# Patient Record
Sex: FEMALE | Race: WHITE | Employment: FULL TIME | URBAN - METROPOLITAN AREA
[De-identification: names, ages, dates, MRNs, and addresses within clinical notes are randomized per-mention and may not be internally consistent; named-entity substitution may affect disease eponyms.]

---

## 2017-04-05 ENCOUNTER — OFFICE VISIT (OUTPATIENT)
Dept: FAMILY MEDICINE | Facility: CLINIC | Age: 37
End: 2017-04-05
Payer: COMMERCIAL

## 2017-04-05 VITALS
HEIGHT: 61 IN | HEART RATE: 88 BPM | SYSTOLIC BLOOD PRESSURE: 120 MMHG | BODY MASS INDEX: 34.74 KG/M2 | WEIGHT: 184 LBS | TEMPERATURE: 98.4 F | DIASTOLIC BLOOD PRESSURE: 84 MMHG

## 2017-04-05 DIAGNOSIS — R07.89 NON-CARDIAC CHEST PAIN: ICD-10-CM

## 2017-04-05 DIAGNOSIS — K21.9 GASTROESOPHAGEAL REFLUX DISEASE WITHOUT ESOPHAGITIS: Primary | ICD-10-CM

## 2017-04-05 PROCEDURE — 99214 OFFICE O/P EST MOD 30 MIN: CPT | Performed by: FAMILY MEDICINE

## 2017-04-05 RX ORDER — LEVOTHYROXINE SODIUM 100 UG/1
1 TABLET ORAL DAILY
COMMUNITY
Start: 2015-03-06

## 2017-04-05 NOTE — NURSING NOTE
"Chief Complaint   Patient presents with     Chest Pain       Initial /84  Pulse 88  Temp 98.4  F (36.9  C) (Tympanic)  Ht 5' 1\" (1.549 m)  Wt 184 lb (83.5 kg)  LMP 01/04/2017  BMI 34.77 kg/m2 Estimated body mass index is 34.77 kg/(m^2) as calculated from the following:    Height as of this encounter: 5' 1\" (1.549 m).    Weight as of this encounter: 184 lb (83.5 kg).  Medication Reconciliation: complete    Current Outpatient Prescriptions   Medication Sig Dispense Refill     metFORMIN (GLUCOPHAGE) 500 MG tablet 1 tablet 2 times daily (with meals)       levothyroxine (SYNTHROID/LEVOTHROID) 100 MCG tablet 1 tablet daily       Prenatal Vit-Fe Fumarate-FA (PRENATAL ONE DAILY PO) Take 1 tablet by mouth daily       Cholecalciferol (VITAMIN D3 PO) Take by mouth daily       Omega-3 Fatty Acids (OMEGA-3 FISH OIL PO) Take 2 g by mouth 2 times daily (with meals)         Thompson COOK CMA  "

## 2017-04-05 NOTE — PROGRESS NOTES
SUBJECTIVE:                                                    Tahmina Haji is a 36 year old female who presents to clinic today for the following health issues:    CHEST PAIN     Onset: 1 week    Feels burning, no chest pain, shortness of breath. No exertional symptoms, has use ibuprofen for  1 week for hip pain.    Description:   Location:  entire chest  Character: achey and burning  Radiation: epigastrium  Duration: waxing and waning     Intensity: moderate    Progression of Symptoms:  waxing and waning    Accompanying Signs & Symptoms:  Shortness of breath: no  Sweating: no  Nausea/vomiting: no  Lightheadedness: no  Palpitations: no  Fever/Chills: no   Cough: no   Heartburn: YES    History:   Family history of heart disease YES  Tobacco use: no    Precipitating factors:   Worse with exertion: no   Worse with deep breaths :  no   Related to food: Possible - grapefruit made it worse                   Problem list and histories reviewed & adjusted, as indicated.  Additional history: as documented    Patient Active Problem List   Diagnosis     CARDIOVASCULAR SCREENING; LDL GOAL LESS THAN 160     Hypothyroidism     Past Surgical History:   Procedure Laterality Date     NO HISTORY OF SURGERY         Social History   Substance Use Topics     Smoking status: Never Smoker     Smokeless tobacco: Never Used     Alcohol use Yes     Family History   Problem Relation Age of Onset     CEREBROVASCULAR DISEASE Maternal Grandmother      HEART DISEASE Paternal Grandfather      CEREBROVASCULAR DISEASE Paternal Grandfather      Cardiovascular Paternal Grandfather          Current Outpatient Prescriptions   Medication Sig Dispense Refill     metFORMIN (GLUCOPHAGE) 500 MG tablet 1 tablet 2 times daily (with meals)       levothyroxine (SYNTHROID/LEVOTHROID) 100 MCG tablet 1 tablet daily       Prenatal Vit-Fe Fumarate-FA (PRENATAL ONE DAILY PO) Take 1 tablet by mouth daily       omeprazole (PRILOSEC) 20 MG CR capsule Take 1  "capsule (20 mg) by mouth daily 30 capsule 1     Cholecalciferol (VITAMIN D3 PO) Take by mouth daily       Omega-3 Fatty Acids (OMEGA-3 FISH OIL PO) Take 2 g by mouth 2 times daily (with meals)         Reviewed and updated as needed this visit by clinical staff  Tobacco  Allergies  Meds  Soc Hx      Reviewed and updated as needed this visit by Provider         ROS:  C: NEGATIVE for fever, chills, change in weight  E/M: NEGATIVE for ear, mouth and throat problems  R: NEGATIVE for significant cough or SOB  CV: burning sensation in the chest.    OBJECTIVE:                                                    /84  Pulse 88  Temp 98.4  F (36.9  C) (Tympanic)  Ht 5' 1\" (1.549 m)  Wt 184 lb (83.5 kg)  LMP 01/04/2017  BMI 34.77 kg/m2  Body mass index is 34.77 kg/(m^2).   GENERAL: healthy, alert, well nourished, well hydrated, no distress  NECK: no tenderness, no adenopathy, no asymmetry, no masses, no stiffness; thyroid- normal to palpation  RESP: lungs clear to auscultation - no rales, no rhonchi, no wheezes  CV: regular rates and rhythm, normal S1 S2, no S3 or S4 and no murmur, no click or rub -  ABDOMEN: soft, no tenderness, no  hepatosplenomegaly, no masses, normal bowel sounds         ASSESSMENT/PLAN:                                                        ICD-10-CM    1. Gastroesophageal reflux disease without esophagitis K21.9 omeprazole (PRILOSEC) 20 MG CR capsule   2. Non-cardiac chest pain R07.89        Patient does not have have exertional symptoms., no chest pain, radiating, most of her discomfort in epigastric area.  Symptoms more consistent with GERD or stomach issues. Advice to avoid ibuprofen or similar medication.  Use Prilosec 20 mg for 2 weeks and if needed can continue for next 1 month.   Warning sings were discussed with the patient.    Gregg Greenberg MD  [facility] [place]    "

## 2017-04-05 NOTE — MR AVS SNAPSHOT
"              After Visit Summary   4/5/2017    Tahmina Haji    MRN: [id number]           Patient Information     Date Of Birth          1980        Visit Information        Provider Department      4/5/2017 3:20 PM Gregg Greenberg MD [facility] [place]        Today's Diagnoses     Gastroesophageal reflux disease without esophagitis    -  1    Non-cardiac chest pain           Follow-ups after your visit        Who to contact     If you have questions or need follow up information about today's clinic visit or your schedule please contact [facility] [place] directly at [phone].  Normal or non-critical lab and imaging results will be communicated to you by [employer], letter or phone within 4 business days after the clinic has received the results. If you do not hear from us within 7 days, please contact the clinic through [employer] or phone. If you have a critical or abnormal lab result, we will notify you by phone as soon as possible.  Submit refill requests through [facility] or call your pharmacy and they will forward the refill request to us. Please allow 3 business days for your refill to be completed.          Additional Information About Your Visit        MyChart Information     [facility] gives you secure access to your electronic health record. If you see a primary care provider, you can also send messages to your care team and make appointments. If you have questions, please call your primary care clinic.  If you do not have a primary care provider, please call [phone] and they will assist you.        Care EveryWhere ID     This is your Care EveryWhere ID. This could be used by other organizations to access your [facility] medical records  [id number]        Your Vitals Were     Pulse Temperature Height Last Period BMI (Body Mass Index)       88 98.4  F (36.9  C) (Tympanic) 5' 1\" (1.549 m) 01/04/2017 34.77 kg/m2        Blood Pressure from Last 3 Encounters:   04/05/17 120/84 "   08/26/14 118/82    Weight from Last 3 Encounters:   04/05/17 184 lb (83.5 kg)   08/26/14 182 lb (82.6 kg)              Today, you had the following     No orders found for display         Today's Medication Changes          These changes are accurate as of: 4/5/17  3:50 PM.  If you have any questions, ask your nurse or doctor.               Start taking these medicines.        Dose/Directions    omeprazole 20 MG CR capsule   Commonly known as:  priLOSEC   Used for:  Gastroesophageal reflux disease without esophagitis   Started by:  Gregg Greenberg MD        Dose:  20 mg   Take 1 capsule (20 mg) by mouth daily   Quantity:  30 capsule   Refills:  1         These medicines have changed or have updated prescriptions.        Dose/Directions    levothyroxine 100 MCG tablet   Commonly known as:  SYNTHROID/LEVOTHROID   This may have changed:  Another medication with the same name was removed. Continue taking this medication, and follow the directions you see here.   Changed by:  Gregg Greenberg MD        Dose:  1 tablet   1 tablet daily   Refills:  0         Stop taking these medicines if you haven't already. Please contact your care team if you have questions.     Multi-vitamin Tabs tablet   Stopped by:  Gregg Greenberg MD                Where to get your medicines      These medications were sent to [id number] - [place], [place] - [address]  [address] [facility] [place] [address]     Phone:  [phone]     omeprazole 20 MG CR capsule                Primary Care Provider Office Phone # Fax #    Julieth Gutierres PA-C [phone]       [facility] [address] [place] [address] [address]  [place] [address]        Thank you!     [facility] [place]  [facility]. Our goal is always to provide you with excellent care. Hearing back from our patients is one way we can continue to improve our services. Please take a few minutes to complete the  written survey that you may receive in the mail after your visit with us. Thank you!             Your Updated Medication List - Protect others around you: Learn how to safely use, store and throw away your medicines at www.disposemymeds.org.          This list is accurate as of: 4/5/17  3:50 PM.  Always use your most recent med list.                   Brand Name Dispense Instructions for use    levothyroxine 100 MCG tablet    SYNTHROID/LEVOTHROID     1 tablet daily       metFORMIN 500 MG tablet    GLUCOPHAGE     1 tablet 2 times daily (with meals)       OMEGA-3 FISH OIL PO      Take 2 g by mouth 2 times daily (with meals)       omeprazole 20 MG CR capsule    priLOSEC    30 capsule    Take 1 capsule (20 mg) by mouth daily       PRENATAL ONE DAILY PO      Take 1 tablet by mouth daily       VITAMIN D3 PO      Take by mouth daily

## 2017-11-15 LAB
ABO + RH BLD: NORMAL
ABO + RH BLD: NORMAL
BLD GP AB SCN SERPL QL: NORMAL
HBV SURFACE AG SERPL QL IA: NORMAL
HIV 1+2 AB+HIV1 P24 AG SERPL QL IA: NORMAL
RUBELLA ANTIBODY IGG QUANTITATIVE: NORMAL IU/ML
T PALLIDUM IGG SER QL: NON REACTIVE

## 2017-12-17 ENCOUNTER — HEALTH MAINTENANCE LETTER (OUTPATIENT)
Age: 37
End: 2017-12-17

## 2017-12-19 ENCOUNTER — TELEPHONE (OUTPATIENT)
Dept: FAMILY MEDICINE | Facility: CLINIC | Age: 37
End: 2017-12-19

## 2017-12-19 NOTE — TELEPHONE ENCOUNTER
"Patient Communication Preferences indicate  Do not contact  and/or communication by \"Phone\" is not preferred. Call not required per Outreach team.      Outreach ,  Leann Pardo                      "

## 2018-06-01 LAB — GROUP B STREP PCR: NORMAL

## 2018-06-16 ENCOUNTER — HOSPITAL ENCOUNTER (OUTPATIENT)
Facility: CLINIC | Age: 38
Discharge: HOME | End: 2018-06-17
Attending: OBSTETRICS & GYNECOLOGY | Admitting: OBSTETRICS & GYNECOLOGY
Payer: COMMERCIAL

## 2018-06-16 LAB
ALT SERPL W P-5'-P-CCNC: 18 U/L (ref 0–50)
ANION GAP SERPL CALCULATED.3IONS-SCNC: 8 MMOL/L (ref 3–14)
AST SERPL W P-5'-P-CCNC: 17 U/L (ref 0–45)
BUN SERPL-MCNC: 6 MG/DL (ref 7–30)
CALCIUM SERPL-MCNC: 8.6 MG/DL (ref 8.5–10.1)
CHLORIDE SERPL-SCNC: 109 MMOL/L (ref 94–109)
CO2 SERPL-SCNC: 23 MMOL/L (ref 20–32)
CREAT SERPL-MCNC: 0.53 MG/DL (ref 0.52–1.04)
ERYTHROCYTE [DISTWIDTH] IN BLOOD BY AUTOMATED COUNT: 13.2 % (ref 10–15)
GFR SERPL CREATININE-BSD FRML MDRD: >90 ML/MIN/1.7M2
GLUCOSE SERPL-MCNC: 102 MG/DL (ref 70–99)
HCT VFR BLD AUTO: 34.6 % (ref 35–47)
HGB BLD-MCNC: 12 G/DL (ref 11.7–15.7)
MCH RBC QN AUTO: 29.1 PG (ref 26.5–33)
MCHC RBC AUTO-ENTMCNC: 34.7 G/DL (ref 31.5–36.5)
MCV RBC AUTO: 84 FL (ref 78–100)
PLATELET # BLD AUTO: 174 10E9/L (ref 150–450)
POTASSIUM SERPL-SCNC: 3.3 MMOL/L (ref 3.4–5.3)
PROT UR-MCNC: 0.16 G/L
PROT/CREAT 24H UR: 0.27 G/G CR (ref 0–0.2)
RBC # BLD AUTO: 4.12 10E12/L (ref 3.8–5.2)
RUPTURE OF FETAL MEMBRANES BY ROM PLUS: NEGATIVE
SODIUM SERPL-SCNC: 140 MMOL/L (ref 133–144)
URATE SERPL-MCNC: 3.8 MG/DL (ref 2.6–6)
WBC # BLD AUTO: 9.3 10E9/L (ref 4–11)

## 2018-06-16 PROCEDURE — 84550 ASSAY OF BLOOD/URIC ACID: CPT | Performed by: OBSTETRICS & GYNECOLOGY

## 2018-06-16 PROCEDURE — 84156 ASSAY OF PROTEIN URINE: CPT | Performed by: OBSTETRICS & GYNECOLOGY

## 2018-06-16 PROCEDURE — 84450 TRANSFERASE (AST) (SGOT): CPT | Performed by: OBSTETRICS & GYNECOLOGY

## 2018-06-16 PROCEDURE — 80048 BASIC METABOLIC PNL TOTAL CA: CPT | Performed by: OBSTETRICS & GYNECOLOGY

## 2018-06-16 PROCEDURE — 84460 ALANINE AMINO (ALT) (SGPT): CPT | Performed by: OBSTETRICS & GYNECOLOGY

## 2018-06-16 PROCEDURE — G0463 HOSPITAL OUTPT CLINIC VISIT: HCPCS | Mod: 25

## 2018-06-16 PROCEDURE — 85027 COMPLETE CBC AUTOMATED: CPT | Performed by: OBSTETRICS & GYNECOLOGY

## 2018-06-16 PROCEDURE — 84112 EVAL AMNIOTIC FLUID PROTEIN: CPT | Performed by: OBSTETRICS & GYNECOLOGY

## 2018-06-16 PROCEDURE — 36415 COLL VENOUS BLD VENIPUNCTURE: CPT | Performed by: OBSTETRICS & GYNECOLOGY

## 2018-06-16 PROCEDURE — 59025 FETAL NON-STRESS TEST: CPT

## 2018-06-16 RX ORDER — ONDANSETRON 2 MG/ML
4 INJECTION INTRAMUSCULAR; INTRAVENOUS EVERY 6 HOURS PRN
Status: DISCONTINUED | OUTPATIENT
Start: 2018-06-16 | End: 2018-06-17 | Stop reason: HOSPADM

## 2018-06-16 NOTE — IP AVS SNAPSHOT
MRN:[id number]                      After Visit Summary   6/16/2018    Tahmina Haji    MRN: [id number]           Thank you!     Thank you for choosing [facility] for your care. Our goal is always to provide you with excellent care. Hearing back from our patients is one way we can continue to improve our services. Please take a few minutes to complete the written survey that you may receive in the mail after you visit with us. Thank you!        Patient Information     Date Of Birth          1980        About your hospital stay     You were admitted on:  June 16, 2018 You last received care in the:  [facility]    You were discharged on:  June 17, 2018       Who to Call     For medical emergencies, please call 911.  For non-urgent questions about your medical care, please call your primary care provider or clinic, None          Attending Provider     Provider Specialty    Komal Guido MD OB/Gyn       Primary Care Provider    None Specified      Further instructions from your care team       Discharge Instruction for Undelivered Patients      You were seen for: Membrane Assessment  We Consulted: Dr. Guido  You had (Test or Medicine):Fetal Monitoring, ROM+, Preeclamptic Labs, and Blood Pressure monitoring     Diet:   Drink 8 to 12 glasses of liquids (milk, juice, water) every day.  You may eat meals and snacks.     Activity:  Count fetal kicks everyday (see handout)  Call your doctor or nurse midwife if your baby is moving less than usual.     Call your provider if you notice:  Swelling in your face or increased swelling in your hands or legs.  Headaches that are not relieved by Tylenol (acetaminophen).  Changes in your vision (blurring: seeing spots or stars.)  Nausea (sick to your stomach) and vomiting (throwing up).   Weight gain of 5 pounds or more per week.  Heartburn that doesn't go away.  Signs of bladder infection: pain when you urinate (use the toilet), need to go  "more often and more urgently.  The bag of lyon (rupture of membranes) breaks, or you notice leaking in your underwear.  Bright red blood in your underwear.  Abdominal (lower belly) or stomach pain.  For first baby: Contractions (tightening) less than 5 minutes apart for one hour or more.  Second (plus) baby: Contractions (tightening) less than 10 minutes apart and getting stronger.  *If less than 34 weeks: Contractions (tightenings) more than 6 times in one hour.  Increase or change in vaginal discharge (note the color and amount)      Follow-up:  As scheduled in the clinic          Pending Results     No orders found for last 3 day(s).            Admission Information     Date & Time Provider Department Dept. Phone    6/16/2018 Komal Guido MD [facility] [phone]      Your Vitals Were     Blood Pressure Pulse Temperature Respirations Height Weight    128/73 105 98.3  F (36.8  C) (Temporal) 18 1.575 m (5' 2\") 86.2 kg (190 lb)    BMI (Body Mass Index)                   34.75 kg/m2           Observe [employer] Information     [facility] gives you secure access to your electronic health record. If you see a primary care provider, you can also send messages to your care team and make appointments. If you have questions, please call your primary care clinic.  If you do not have a primary care provider, please call [phone] and they will assist you.        Care EveryWhere ID     This is your Care EveryWhere ID. This could be used by other organizations to access your [facility] medical records  [id number]        Equal Access to Services     LEROY BENSON : Hadii salvador betheao Sojaelali, waaxda luqadaha, qaybta kaalmada juan, julieta watts. So [facility] [phone].    ATENCIÓN: Si habla español, tiene a salvador disposición servicios gratuitos de asistencia lingüística. Llame al [phone].    We comply with applicable federal civil rights laws and [place] laws. We do not " discriminate on the basis of race, color, national origin, age, disability, sex, sexual orientation, or gender identity.               Review of your medicines      UNREVIEWED medicines. Ask your doctor about these medicines        Dose / Directions    levothyroxine 100 MCG tablet   Commonly known as:  SYNTHROID/LEVOTHROID        Dose:  1 tablet   1 tablet daily   Refills:  0       metFORMIN 500 MG tablet   Commonly known as:  GLUCOPHAGE        Dose:  1 tablet   1 tablet 2 times daily (with meals)   Refills:  0       OMEGA-3 FISH OIL PO        Dose:  2 g   Take 2 g by mouth 2 times daily (with meals)   Refills:  0       PRENATAL ONE DAILY PO        Dose:  1 tablet   Take 1 tablet by mouth daily   Refills:  0       VITAMIN D3 PO        Take by mouth daily   Refills:  0                Protect others around you: Learn how to safely use, store and throw away your medicines at www.disposemymeds.org.             Medication List: This is a list of all your medications and when to take them. Check marks below indicate your daily home schedule. Keep this list as a reference.      Medications           Morning Afternoon Evening Bedtime As Needed    levothyroxine 100 MCG tablet   Commonly known as:  SYNTHROID/LEVOTHROID   1 tablet daily                                metFORMIN 500 MG tablet   Commonly known as:  GLUCOPHAGE   1 tablet 2 times daily (with meals)                                OMEGA-3 FISH OIL PO   Take 2 g by mouth 2 times daily (with meals)                                PRENATAL ONE DAILY PO   Take 1 tablet by mouth daily                                VITAMIN D3 PO   Take by mouth daily

## 2018-06-16 NOTE — IP AVS SNAPSHOT
[address], [address]    [facility] [address]    Phone:  [phone]                                       After Visit Summary   6/16/2018    Tahmina Haji    MRN: [id number]           After Visit Summary Signature Page     I have received my discharge instructions, and my questions have been answered. I have discussed any challenges I see with this plan with the nurse or doctor.    ..........................................................................................................................................  Patient/Patient Representative Signature      ..........................................................................................................................................  Patient Representative Print Name and Relationship to Patient    ..................................................               ................................................  Date                                            Time    ..........................................................................................................................................  Reviewed by Signature/Title    ...................................................              ..............................................  Date                                                            Time

## 2018-06-17 VITALS
DIASTOLIC BLOOD PRESSURE: 78 MMHG | RESPIRATION RATE: 18 BRPM | HEIGHT: 62 IN | WEIGHT: 190 LBS | BODY MASS INDEX: 34.96 KG/M2 | SYSTOLIC BLOOD PRESSURE: 121 MMHG | TEMPERATURE: 98.3 F | HEART RATE: 105 BPM

## 2018-06-17 PROCEDURE — 59025 FETAL NON-STRESS TEST: CPT

## 2018-06-17 PROCEDURE — G0463 HOSPITAL OUTPT CLINIC VISIT: HCPCS | Mod: 25

## 2018-06-17 NOTE — DISCHARGE INSTRUCTIONS
Discharge Instruction for Undelivered Patients      You were seen for: Membrane Assessment  We Consulted: Dr. Guido  You had (Test or Medicine):Fetal Monitoring, ROM+, Preeclamptic Labs, and Blood Pressure monitoring     Diet:   Drink 8 to 12 glasses of liquids (milk, juice, water) every day.  You may eat meals and snacks.     Activity:  Count fetal kicks everyday (see handout)  Call your doctor or nurse midwife if your baby is moving less than usual.     Call your provider if you notice:  Swelling in your face or increased swelling in your hands or legs.  Headaches that are not relieved by Tylenol (acetaminophen).  Changes in your vision (blurring: seeing spots or stars.)  Nausea (sick to your stomach) and vomiting (throwing up).   Weight gain of 5 pounds or more per week.  Heartburn that doesn't go away.  Signs of bladder infection: pain when you urinate (use the toilet), need to go more often and more urgently.  The bag of lyon (rupture of membranes) breaks, or you notice leaking in your underwear.  Bright red blood in your underwear.  Abdominal (lower belly) or stomach pain.  For first baby: Contractions (tightening) less than 5 minutes apart for one hour or more.  Second (plus) baby: Contractions (tightening) less than 10 minutes apart and getting stronger.  *If less than 34 weeks: Contractions (tightenings) more than 6 times in one hour.  Increase or change in vaginal discharge (note the color and amount)      Follow-up:  As scheduled in the clinic

## 2018-06-17 NOTE — PROVIDER NOTIFICATION
Dr. Guido notified of patients negative ROM+, elevated blood pressures and SVE.  Plan to run preeclampsia labs.  Will continue to monitor and update.

## 2018-06-17 NOTE — PROVIDER NOTIFICATION
Dr. Guido updated with patients laboratory results and blood pressures.  Pressures trending down.  Plan to keep patient for one hour and repeat blood pressures every 15 minutes.  Will continue to monitor and update.

## 2018-06-17 NOTE — PROGRESS NOTES
Data: Patient presented to the [facility] at 2133 18.   Reason for maternal/fetal assessment per patient is rule out rupture of membranes. Patient is a . Prenatal record reviewed. Gestational Age 39w0d. VSS.  Blood Pressure elevated. Cervix: posterior, closed and effaced 30-50%.  Fetal movement present. Patient denies cramping, backache, vaginal discharge, pelvic pressure, UTI symptoms, GI problems, bloody show, vaginal bleeding, edema, headache, visual disturbances, epigastric or URQ pain, abdominal pain, rupture of membranes. Support persons Emir, , present.  Action: Verbal consent for EFM. Triage assessment completed. EFM applied for 3 hours. Uterine assessment: Occasional contractions with irritability. Fetal assessment: Presumed adequate fetal oxygenation documented (see flow record).  Patient instructed to report change in fetal movement, vaginal leaking of fluid or bleeding, abdominal pain, or any concerns related to the pregnancy to her nurse/physician.   Response: Dr. Guido informed of patient status. Plan per provider is to discharge home. Patient verbalized understanding of education and verbalized agreement with plan. Discharged ambulatory at 0050.

## 2018-06-22 ENCOUNTER — HOSPITAL ENCOUNTER (INPATIENT)
Facility: CLINIC | Age: 38
LOS: 3 days | Discharge: HOME HEALTH | End: 2018-06-25
Attending: OBSTETRICS & GYNECOLOGY | Admitting: OBSTETRICS & GYNECOLOGY
Payer: COMMERCIAL

## 2018-06-22 PROBLEM — O13.9 GESTATIONAL HTN: Status: ACTIVE | Noted: 2018-06-22

## 2018-06-22 PROBLEM — O13.9 GESTATIONAL HYPERTENSION: Status: ACTIVE | Noted: 2018-06-22

## 2018-06-22 LAB
ABO + RH BLD: NORMAL
ABO + RH BLD: NORMAL
ALBUMIN SERPL-MCNC: 2.6 G/DL (ref 3.4–5)
ALP SERPL-CCNC: 96 U/L (ref 40–150)
ALT SERPL W P-5'-P-CCNC: 16 U/L (ref 0–50)
ANION GAP SERPL CALCULATED.3IONS-SCNC: 8 MMOL/L (ref 3–14)
AST SERPL W P-5'-P-CCNC: 16 U/L (ref 0–45)
BILIRUB SERPL-MCNC: 0.3 MG/DL (ref 0.2–1.3)
BLD GP AB SCN SERPL QL: NORMAL
BLOOD BANK CMNT PATIENT-IMP: NORMAL
BUN SERPL-MCNC: 8 MG/DL (ref 7–30)
CALCIUM SERPL-MCNC: 9 MG/DL (ref 8.5–10.1)
CHLORIDE SERPL-SCNC: 109 MMOL/L (ref 94–109)
CO2 SERPL-SCNC: 23 MMOL/L (ref 20–32)
CREAT SERPL-MCNC: 0.67 MG/DL (ref 0.52–1.04)
CREAT UR-MCNC: 24 MG/DL
ERYTHROCYTE [DISTWIDTH] IN BLOOD BY AUTOMATED COUNT: 13.3 % (ref 10–15)
GFR SERPL CREATININE-BSD FRML MDRD: >90 ML/MIN/1.7M2
GLUCOSE SERPL-MCNC: 78 MG/DL (ref 70–99)
HCT VFR BLD AUTO: 35.7 % (ref 35–47)
HGB BLD-MCNC: 12.3 G/DL (ref 11.7–15.7)
MCH RBC QN AUTO: 29.1 PG (ref 26.5–33)
MCHC RBC AUTO-ENTMCNC: 34.5 G/DL (ref 31.5–36.5)
MCV RBC AUTO: 84 FL (ref 78–100)
PLATELET # BLD AUTO: 186 10E9/L (ref 150–450)
POTASSIUM SERPL-SCNC: 3.7 MMOL/L (ref 3.4–5.3)
PROT SERPL-MCNC: 6.2 G/DL (ref 6.8–8.8)
PROT UR-MCNC: <0.05 G/L
PROT/CREAT 24H UR: NORMAL G/G CR (ref 0–0.2)
RBC # BLD AUTO: 4.23 10E12/L (ref 3.8–5.2)
SODIUM SERPL-SCNC: 140 MMOL/L (ref 133–144)
SPECIMEN EXP DATE BLD: NORMAL
WBC # BLD AUTO: 8.2 10E9/L (ref 4–11)

## 2018-06-22 PROCEDURE — 25000128 H RX IP 250 OP 636: Performed by: OBSTETRICS & GYNECOLOGY

## 2018-06-22 PROCEDURE — 84460 ALANINE AMINO (ALT) (SGPT): CPT | Performed by: OBSTETRICS & GYNECOLOGY

## 2018-06-22 PROCEDURE — 86901 BLOOD TYPING SEROLOGIC RH(D): CPT | Performed by: OBSTETRICS & GYNECOLOGY

## 2018-06-22 PROCEDURE — 3E0P7VZ INTRODUCTION OF HORMONE INTO FEMALE REPRODUCTIVE, VIA NATURAL OR ARTIFICIAL OPENING: ICD-10-PCS | Performed by: OBSTETRICS & GYNECOLOGY

## 2018-06-22 PROCEDURE — 36415 COLL VENOUS BLD VENIPUNCTURE: CPT | Performed by: OBSTETRICS & GYNECOLOGY

## 2018-06-22 PROCEDURE — 86900 BLOOD TYPING SEROLOGIC ABO: CPT | Performed by: OBSTETRICS & GYNECOLOGY

## 2018-06-22 PROCEDURE — G0463 HOSPITAL OUTPT CLINIC VISIT: HCPCS | Mod: 25

## 2018-06-22 PROCEDURE — 12000029 ZZH R&B OB INTERMEDIATE

## 2018-06-22 PROCEDURE — 25000132 ZZH RX MED GY IP 250 OP 250 PS 637: Performed by: OBSTETRICS & GYNECOLOGY

## 2018-06-22 PROCEDURE — 86850 RBC ANTIBODY SCREEN: CPT | Performed by: OBSTETRICS & GYNECOLOGY

## 2018-06-22 PROCEDURE — 10907ZC DRAINAGE OF AMNIOTIC FLUID, THERAPEUTIC FROM PRODUCTS OF CONCEPTION, VIA NATURAL OR ARTIFICIAL OPENING: ICD-10-PCS | Performed by: OBSTETRICS & GYNECOLOGY

## 2018-06-22 PROCEDURE — 84156 ASSAY OF PROTEIN URINE: CPT | Performed by: OBSTETRICS & GYNECOLOGY

## 2018-06-22 PROCEDURE — 80053 COMPREHEN METABOLIC PANEL: CPT | Performed by: OBSTETRICS & GYNECOLOGY

## 2018-06-22 PROCEDURE — 59025 FETAL NON-STRESS TEST: CPT

## 2018-06-22 PROCEDURE — 85027 COMPLETE CBC AUTOMATED: CPT | Performed by: OBSTETRICS & GYNECOLOGY

## 2018-06-22 RX ORDER — TERBUTALINE SULFATE 1 MG/ML
0.25 INJECTION, SOLUTION SUBCUTANEOUS
Status: DISCONTINUED | OUTPATIENT
Start: 2018-06-22 | End: 2018-06-23

## 2018-06-22 RX ORDER — LABETALOL HYDROCHLORIDE 5 MG/ML
40 INJECTION, SOLUTION INTRAVENOUS EVERY 10 MIN PRN
Status: DISCONTINUED | OUTPATIENT
Start: 2018-06-22 | End: 2018-06-22 | Stop reason: CLARIF

## 2018-06-22 RX ORDER — LABETALOL HYDROCHLORIDE 5 MG/ML
20 INJECTION, SOLUTION INTRAVENOUS EVERY 10 MIN PRN
Status: DISCONTINUED | OUTPATIENT
Start: 2018-06-22 | End: 2018-06-22 | Stop reason: CLARIF

## 2018-06-22 RX ORDER — MAGNESIUM SULFATE HEPTAHYDRATE 40 MG/ML
4 INJECTION, SOLUTION INTRAVENOUS
Status: DISCONTINUED | OUTPATIENT
Start: 2018-06-22 | End: 2018-06-22 | Stop reason: CLARIF

## 2018-06-22 RX ORDER — LORAZEPAM 2 MG/ML
2 INJECTION INTRAMUSCULAR
Status: DISCONTINUED | OUTPATIENT
Start: 2018-06-22 | End: 2018-06-22 | Stop reason: CLARIF

## 2018-06-22 RX ORDER — LABETALOL HYDROCHLORIDE 5 MG/ML
80 INJECTION, SOLUTION INTRAVENOUS EVERY 10 MIN PRN
Status: DISCONTINUED | OUTPATIENT
Start: 2018-06-22 | End: 2018-06-22 | Stop reason: CLARIF

## 2018-06-22 RX ORDER — SODIUM CHLORIDE, SODIUM LACTATE, POTASSIUM CHLORIDE, CALCIUM CHLORIDE 600; 310; 30; 20 MG/100ML; MG/100ML; MG/100ML; MG/100ML
10-125 INJECTION, SOLUTION INTRAVENOUS CONTINUOUS
Status: DISCONTINUED | OUTPATIENT
Start: 2018-06-22 | End: 2018-06-22 | Stop reason: CLARIF

## 2018-06-22 RX ORDER — CALCIUM GLUCONATE 94 MG/ML
1 INJECTION, SOLUTION INTRAVENOUS
Status: DISCONTINUED | OUTPATIENT
Start: 2018-06-22 | End: 2018-06-25 | Stop reason: HOSPADM

## 2018-06-22 RX ORDER — PENICILLIN G POTASSIUM 5000000 [IU]/1
5 INJECTION, POWDER, FOR SOLUTION INTRAMUSCULAR; INTRAVENOUS ONCE
Status: COMPLETED | OUTPATIENT
Start: 2018-06-22 | End: 2018-06-22

## 2018-06-22 RX ORDER — MAGNESIUM SULFATE IN WATER 40 MG/ML
2 INJECTION, SOLUTION INTRAVENOUS CONTINUOUS
Status: DISCONTINUED | OUTPATIENT
Start: 2018-06-22 | End: 2018-06-22 | Stop reason: CLARIF

## 2018-06-22 RX ORDER — MAGNESIUM SULFATE HEPTAHYDRATE 40 MG/ML
2 INJECTION, SOLUTION INTRAVENOUS
Status: DISCONTINUED | OUTPATIENT
Start: 2018-06-22 | End: 2018-06-22 | Stop reason: CLARIF

## 2018-06-22 RX ORDER — ONDANSETRON 2 MG/ML
4 INJECTION INTRAMUSCULAR; INTRAVENOUS EVERY 6 HOURS PRN
Status: DISCONTINUED | OUTPATIENT
Start: 2018-06-22 | End: 2018-06-22

## 2018-06-22 RX ORDER — MAGNESIUM SULFATE HEPTAHYDRATE 40 MG/ML
4 INJECTION, SOLUTION INTRAVENOUS ONCE
Status: DISCONTINUED | OUTPATIENT
Start: 2018-06-22 | End: 2018-06-22 | Stop reason: CLARIF

## 2018-06-22 RX ORDER — LEVOTHYROXINE SODIUM 50 UG/1
100 TABLET ORAL DAILY
Status: DISCONTINUED | OUTPATIENT
Start: 2018-06-22 | End: 2018-06-25 | Stop reason: HOSPADM

## 2018-06-22 RX ORDER — NIFEDIPINE 10 MG/1
10 CAPSULE ORAL
Status: DISCONTINUED | OUTPATIENT
Start: 2018-06-22 | End: 2018-06-22 | Stop reason: CLARIF

## 2018-06-22 RX ORDER — MAGNESIUM SULFATE HEPTAHYDRATE 500 MG/ML
4 INJECTION, SOLUTION INTRAMUSCULAR; INTRAVENOUS
Status: DISCONTINUED | OUTPATIENT
Start: 2018-06-22 | End: 2018-06-22 | Stop reason: CLARIF

## 2018-06-22 RX ADMIN — PENICILLIN G POTASSIUM 5 MILLION UNITS: 5000000 POWDER, FOR SOLUTION INTRAMUSCULAR; INTRAPLEURAL; INTRATHECAL; INTRAVENOUS at 18:45

## 2018-06-22 RX ADMIN — SODIUM CHLORIDE 2.5 MILLION UNITS: 9 INJECTION, SOLUTION INTRAVENOUS at 22:52

## 2018-06-22 RX ADMIN — DINOPROSTONE 10 MG: 10 INSERT VAGINAL at 20:34

## 2018-06-22 RX ADMIN — SODIUM CHLORIDE, POTASSIUM CHLORIDE, SODIUM LACTATE AND CALCIUM CHLORIDE 125 ML/HR: 600; 310; 30; 20 INJECTION, SOLUTION INTRAVENOUS at 18:30

## 2018-06-22 NOTE — IP AVS SNAPSHOT
MRN:[id number]                      After Visit Summary   6/22/2018    Tahmina Haji    MRN: [id number]           Thank you!     Thank you for choosing [facility] for your care. Our goal is always to provide you with excellent care. Hearing back from our patients is one way we can continue to improve our services. Please take a few minutes to complete the written survey that you may receive in the mail after you visit with us. Thank you!        Patient Information     Date Of Birth          1980        Designated Caregiver       Most Recent Value    Caregiver    Name of designated caregiver mayo      About your hospital stay     You were admitted on:  June 22, 2018 You last received care in the:  [address]    You were discharged on:  June 25, 2018       Who to Call     For medical emergencies, please call 911.  For non-urgent questions about your medical care, please call your primary care provider or clinic, [phone]          Attending Provider     Provider Specialty    Srini Loyd MD OB/Gyn    , Delon Ferrell MD OB/Gyn    Michelle, Stanislav Martell MD OB/Gyn       Primary Care Provider Office Phone # Fax #    Komal Guido [phone]      Further instructions from your care team         Preeclampsia   Call your doctor right away if you have any of the following:  - Edema (swelling) in your face or hands  - Rapid weight gain-about 1 pound or more in a day  - Headache  - Abdominal pain on your right side  - Vision problems (flashes or spots)  - You have questions or concerns once you return home.  Postpartum Vaginal Delivery Instructions    Activity       Ask family and friends for help when you need it.    Do not place anything in your vagina for 6 weeks.    You are not restricted on other activities, but take it easy for a few weeks to allow your body to recover from delivery.  You are able to do any activities you  feel up to that point.    No driving until you have stopped taking your pain medications (usually two weeks after delivery).     Call your health care provider if you have any of these symptoms:       Increased pain, swelling, redness, or fluid around your stiches from an episiotomy or perineal tear.    A fever above 100.4 F (38 C) with or without chills when placing a thermometer under your tongue.    You soak a sanitary pad with blood within 1 hour, or you see blood clots larger than a golf ball.    Bleeding that lasts more than 6 weeks.    Vaginal discharge that smells bad.    Severe pain, cramping or tenderness in your lower belly area.    A need to urinate more frequently (use the toilet more often), more urgently (use the toilet very quickly), or it burns when you urinate.    Nausea and vomiting.    Redness, swelling or pain around a vein in your leg.    Problems breastfeeding or a red or painful area on your breast.    Chest pain and cough or are gasping for air.    Problems coping with sadness, anxiety, or depression.  If you have any concerns about hurting yourself or the baby, call your provider immediately.     You have questions or concerns after you return home.     Keep your hands clean:  Always wash your hands before touching your perineal area and stitches.  This helps reduce your risk of infection.  If your hands aren't dirty, you may use an alcohol hand-rub to clean your hands. Keep your nails clean and short.        Pending Results     No orders found from 6/20/2018 to 6/23/2018.            Admission Information     Date & Time Provider Department Dept. Phone    6/22/2018 Stanislav Martinez MD [address] [phone]      Your Vitals Were     Blood Pressure Pulse Temperature Respirations Pulse Oximetry       131/93 85 98.3  F (36.8  C) (Oral) 16 95%       MyChart Information     [facility] gives you secure access to your electronic health record. If you see a  primary care provider, you can also send messages to your care team and make appointments. If you have questions, please call your primary care clinic.  If you do not have a primary care provider, please call [phone] and they will assist you.        Care EveryWhere ID     This is your Care EveryWhere ID. This could be used by other organizations to access your [facility] medical records  [id number]        Equal Access to Services     MIRA BENSON : Hadii salvador garner hadzaco Sojaelali, waaxda luqadaha, qaybta kaalmada adelindayada, julieta penn haykishortyrell valletristalisa costello . So [facility] [phone].    ATENCIÓN: Si habla esplo, tiene a salvador disposición servicios gratuitos de asistencia lingüística. Janis al [phone].    We comply with applicable federal civil rights laws and [place] laws. We do not discriminate on the basis of race, color, national origin, age, disability, sex, sexual orientation, or gender identity.               Review of your medicines      UNREVIEWED medicines. Ask your doctor about these medicines        Dose / Directions    calcium-magnesium 500-250 MG Tabs per tablet   Commonly known as:  CALMAG        Dose:  1 tablet   Take 1 tablet by mouth daily   Refills:  0       levothyroxine 100 MCG tablet   Commonly known as:  SYNTHROID/LEVOTHROID        Dose:  1 tablet   1 tablet daily   Refills:  0       OMEGA-3 FISH OIL PO        Dose:  2 g   Take 2 g by mouth 2 times daily (with meals)   Refills:  0       PRENATAL ONE DAILY PO        Dose:  1 tablet   Take 1 tablet by mouth daily   Refills:  0       VITAMIN D3 PO        Take by mouth daily   Refills:  0                Protect others around you: Learn how to safely use, store and throw away your medicines at www.disposemymeds.org.             Medication List: This is a list of all your medications and when to take them. Check marks below indicate your daily home schedule. Keep this list as a reference.      Medications           Morning Afternoon  Evening Bedtime As Needed    calcium-magnesium 500-250 MG Tabs per tablet   Commonly known as:  CALMAG   Take 1 tablet by mouth daily                                levothyroxine 100 MCG tablet   Commonly known as:  SYNTHROID/LEVOTHROID   1 tablet daily   Last time this was given:  100 mcg on 6/25/2018  8:43 AM                                OMEGA-3 FISH OIL PO   Take 2 g by mouth 2 times daily (with meals)                                PRENATAL ONE DAILY PO   Take 1 tablet by mouth daily                                VITAMIN D3 PO   Take by mouth daily

## 2018-06-22 NOTE — PLAN OF CARE
Tahmina is a 38yo  39w5d presents from clinic with hypertension. Denies HA, visual changes or epigastric pain. +FM.  is at bedside and supportive.  1750-Dr Bach at bedside to discuss induction.  1800-Pt up to BR then will transfer to L/D room 219. Report given to Helen HOWARD RN to assume cares.

## 2018-06-22 NOTE — IP AVS SNAPSHOT
[address]    [place] [address]    Phone:  [phone]                                       After Visit Summary   6/22/2018    Tahmina Haji    MRN: [id number]           After Visit Summary Signature Page     I have received my discharge instructions, and my questions have been answered. I have discussed any challenges I see with this plan with the nurse or doctor.    ..........................................................................................................................................  Patient/Patient Representative Signature      ..........................................................................................................................................  Patient Representative Print Name and Relationship to Patient    ..................................................               ................................................  Date                                            Time    ..........................................................................................................................................  Reviewed by Signature/Title    ...................................................              ..............................................  Date                                                            Time

## 2018-06-23 ENCOUNTER — ANESTHESIA (OUTPATIENT)
Dept: OBGYN | Facility: CLINIC | Age: 38
End: 2018-06-23
Payer: COMMERCIAL

## 2018-06-23 ENCOUNTER — ANESTHESIA EVENT (OUTPATIENT)
Dept: OBGYN | Facility: CLINIC | Age: 38
End: 2018-06-23
Payer: COMMERCIAL

## 2018-06-23 LAB
ALT SERPL W P-5'-P-CCNC: 14 U/L (ref 0–50)
AST SERPL W P-5'-P-CCNC: 22 U/L (ref 0–45)
CREAT SERPL-MCNC: 0.55 MG/DL (ref 0.52–1.04)
ERYTHROCYTE [DISTWIDTH] IN BLOOD BY AUTOMATED COUNT: 13.3 % (ref 10–15)
GFR SERPL CREATININE-BSD FRML MDRD: >90 ML/MIN/1.7M2
HCT VFR BLD AUTO: 32.3 % (ref 35–47)
HGB BLD-MCNC: 11.2 G/DL (ref 11.7–15.7)
MCH RBC QN AUTO: 29.3 PG (ref 26.5–33)
MCHC RBC AUTO-ENTMCNC: 34.7 G/DL (ref 31.5–36.5)
MCV RBC AUTO: 85 FL (ref 78–100)
PLATELET # BLD AUTO: 169 10E9/L (ref 150–450)
RBC # BLD AUTO: 3.82 10E12/L (ref 3.8–5.2)
WBC # BLD AUTO: 9.2 10E9/L (ref 4–11)

## 2018-06-23 PROCEDURE — 25000128 H RX IP 250 OP 636: Performed by: ANESTHESIOLOGY

## 2018-06-23 PROCEDURE — 37000011 ZZH ANESTHESIA WARD SERVICE

## 2018-06-23 PROCEDURE — 84460 ALANINE AMINO (ALT) (SGPT): CPT | Performed by: OBSTETRICS & GYNECOLOGY

## 2018-06-23 PROCEDURE — 82565 ASSAY OF CREATININE: CPT | Performed by: OBSTETRICS & GYNECOLOGY

## 2018-06-23 PROCEDURE — 0KQM0ZZ REPAIR PERINEUM MUSCLE, OPEN APPROACH: ICD-10-PCS | Performed by: OBSTETRICS & GYNECOLOGY

## 2018-06-23 PROCEDURE — 86780 TREPONEMA PALLIDUM: CPT | Performed by: OBSTETRICS & GYNECOLOGY

## 2018-06-23 PROCEDURE — 00HU33Z INSERTION OF INFUSION DEVICE INTO SPINAL CANAL, PERCUTANEOUS APPROACH: ICD-10-PCS | Performed by: ANESTHESIOLOGY

## 2018-06-23 PROCEDURE — 25000132 ZZH RX MED GY IP 250 OP 250 PS 637: Performed by: OBSTETRICS & GYNECOLOGY

## 2018-06-23 PROCEDURE — 36415 COLL VENOUS BLD VENIPUNCTURE: CPT | Performed by: OBSTETRICS & GYNECOLOGY

## 2018-06-23 PROCEDURE — 72200001 ZZH LABOR CARE VAGINAL DELIVERY SINGLE

## 2018-06-23 PROCEDURE — 25000125 ZZHC RX 250: Performed by: OBSTETRICS & GYNECOLOGY

## 2018-06-23 PROCEDURE — 25000128 H RX IP 250 OP 636: Performed by: OBSTETRICS & GYNECOLOGY

## 2018-06-23 PROCEDURE — 85027 COMPLETE CBC AUTOMATED: CPT | Performed by: OBSTETRICS & GYNECOLOGY

## 2018-06-23 PROCEDURE — 12000037 ZZH R&B POSTPARTUM INTERMEDIATE

## 2018-06-23 PROCEDURE — 27110038 ZZH RX 271: Performed by: ANESTHESIOLOGY

## 2018-06-23 PROCEDURE — 84450 TRANSFERASE (AST) (SGOT): CPT | Performed by: OBSTETRICS & GYNECOLOGY

## 2018-06-23 PROCEDURE — 25000125 ZZHC RX 250: Performed by: ANESTHESIOLOGY

## 2018-06-23 PROCEDURE — 3E0R3BZ INTRODUCTION OF ANESTHETIC AGENT INTO SPINAL CANAL, PERCUTANEOUS APPROACH: ICD-10-PCS | Performed by: ANESTHESIOLOGY

## 2018-06-23 RX ORDER — SODIUM CHLORIDE, SODIUM LACTATE, POTASSIUM CHLORIDE, CALCIUM CHLORIDE 600; 310; 30; 20 MG/100ML; MG/100ML; MG/100ML; MG/100ML
INJECTION, SOLUTION INTRAVENOUS CONTINUOUS
Status: DISCONTINUED | OUTPATIENT
Start: 2018-06-23 | End: 2018-06-23

## 2018-06-23 RX ORDER — IBUPROFEN 400 MG/1
800 TABLET, FILM COATED ORAL
Status: DISCONTINUED | OUTPATIENT
Start: 2018-06-23 | End: 2018-06-23

## 2018-06-23 RX ORDER — EPHEDRINE SULFATE 50 MG/ML
INJECTION, SOLUTION INTRAMUSCULAR; INTRAVENOUS; SUBCUTANEOUS
Status: DISCONTINUED
Start: 2018-06-23 | End: 2018-06-23 | Stop reason: WASHOUT

## 2018-06-23 RX ORDER — CARBOPROST TROMETHAMINE 250 UG/ML
250 INJECTION, SOLUTION INTRAMUSCULAR
Status: DISCONTINUED | OUTPATIENT
Start: 2018-06-23 | End: 2018-06-23

## 2018-06-23 RX ORDER — ACETAMINOPHEN 325 MG/1
650 TABLET ORAL EVERY 4 HOURS PRN
Status: DISCONTINUED | OUTPATIENT
Start: 2018-06-23 | End: 2018-06-25 | Stop reason: HOSPADM

## 2018-06-23 RX ORDER — LANOLIN 100 %
OINTMENT (GRAM) TOPICAL
Status: DISCONTINUED | OUTPATIENT
Start: 2018-06-23 | End: 2018-06-25 | Stop reason: HOSPADM

## 2018-06-23 RX ORDER — HYDROCORTISONE 2.5 %
CREAM (GRAM) TOPICAL 3 TIMES DAILY PRN
Status: DISCONTINUED | OUTPATIENT
Start: 2018-06-23 | End: 2018-06-25 | Stop reason: HOSPADM

## 2018-06-23 RX ORDER — MISOPROSTOL 200 UG/1
400 TABLET ORAL
Status: DISCONTINUED | OUTPATIENT
Start: 2018-06-23 | End: 2018-06-25 | Stop reason: HOSPADM

## 2018-06-23 RX ORDER — NALBUPHINE HYDROCHLORIDE 10 MG/ML
2.5-5 INJECTION, SOLUTION INTRAMUSCULAR; INTRAVENOUS; SUBCUTANEOUS EVERY 6 HOURS PRN
Status: DISCONTINUED | OUTPATIENT
Start: 2018-06-23 | End: 2018-06-23

## 2018-06-23 RX ORDER — AMOXICILLIN 250 MG
2 CAPSULE ORAL 2 TIMES DAILY
Status: DISCONTINUED | OUTPATIENT
Start: 2018-06-23 | End: 2018-06-25 | Stop reason: HOSPADM

## 2018-06-23 RX ORDER — ROPIVACAINE HYDROCHLORIDE 2 MG/ML
10 INJECTION, SOLUTION EPIDURAL; INFILTRATION; PERINEURAL ONCE
Status: COMPLETED | OUTPATIENT
Start: 2018-06-23 | End: 2018-06-23

## 2018-06-23 RX ORDER — BISACODYL 10 MG
10 SUPPOSITORY, RECTAL RECTAL DAILY PRN
Status: DISCONTINUED | OUTPATIENT
Start: 2018-06-25 | End: 2018-06-25 | Stop reason: HOSPADM

## 2018-06-23 RX ORDER — METHYLERGONOVINE MALEATE 0.2 MG/ML
200 INJECTION INTRAVENOUS
Status: DISCONTINUED | OUTPATIENT
Start: 2018-06-23 | End: 2018-06-23

## 2018-06-23 RX ORDER — OXYTOCIN/0.9 % SODIUM CHLORIDE 30/500 ML
1-24 PLASTIC BAG, INJECTION (ML) INTRAVENOUS CONTINUOUS
Status: DISCONTINUED | OUTPATIENT
Start: 2018-06-23 | End: 2018-06-23

## 2018-06-23 RX ORDER — EPHEDRINE SULFATE 50 MG/ML
5 INJECTION, SOLUTION INTRAMUSCULAR; INTRAVENOUS; SUBCUTANEOUS
Status: DISCONTINUED | OUTPATIENT
Start: 2018-06-23 | End: 2018-06-23

## 2018-06-23 RX ORDER — NALOXONE HYDROCHLORIDE 0.4 MG/ML
.1-.4 INJECTION, SOLUTION INTRAMUSCULAR; INTRAVENOUS; SUBCUTANEOUS
Status: DISCONTINUED | OUTPATIENT
Start: 2018-06-23 | End: 2018-06-23

## 2018-06-23 RX ORDER — ONDANSETRON 2 MG/ML
4 INJECTION INTRAMUSCULAR; INTRAVENOUS EVERY 6 HOURS PRN
Status: DISCONTINUED | OUTPATIENT
Start: 2018-06-23 | End: 2018-06-23

## 2018-06-23 RX ORDER — LIDOCAINE 40 MG/G
CREAM TOPICAL
Status: DISCONTINUED | OUTPATIENT
Start: 2018-06-23 | End: 2018-06-23

## 2018-06-23 RX ORDER — IBUPROFEN 400 MG/1
800 TABLET, FILM COATED ORAL EVERY 6 HOURS PRN
Status: DISCONTINUED | OUTPATIENT
Start: 2018-06-23 | End: 2018-06-25 | Stop reason: HOSPADM

## 2018-06-23 RX ORDER — OXYTOCIN/0.9 % SODIUM CHLORIDE 30/500 ML
100 PLASTIC BAG, INJECTION (ML) INTRAVENOUS CONTINUOUS
Status: DISCONTINUED | OUTPATIENT
Start: 2018-06-23 | End: 2018-06-25 | Stop reason: HOSPADM

## 2018-06-23 RX ORDER — LIDOCAINE HYDROCHLORIDE AND EPINEPHRINE 15; 5 MG/ML; UG/ML
INJECTION, SOLUTION EPIDURAL PRN
Status: DISCONTINUED | OUTPATIENT
Start: 2018-06-23 | End: 2018-06-24 | Stop reason: HOSPADM

## 2018-06-23 RX ORDER — OXYTOCIN 10 [USP'U]/ML
10 INJECTION, SOLUTION INTRAMUSCULAR; INTRAVENOUS
Status: DISCONTINUED | OUTPATIENT
Start: 2018-06-23 | End: 2018-06-25 | Stop reason: HOSPADM

## 2018-06-23 RX ORDER — OXYTOCIN/0.9 % SODIUM CHLORIDE 30/500 ML
100-340 PLASTIC BAG, INJECTION (ML) INTRAVENOUS CONTINUOUS PRN
Status: COMPLETED | OUTPATIENT
Start: 2018-06-23 | End: 2018-06-23

## 2018-06-23 RX ORDER — ACETAMINOPHEN 325 MG/1
650 TABLET ORAL EVERY 4 HOURS PRN
Status: DISCONTINUED | OUTPATIENT
Start: 2018-06-23 | End: 2018-06-23

## 2018-06-23 RX ORDER — OXYCODONE AND ACETAMINOPHEN 5; 325 MG/1; MG/1
1 TABLET ORAL
Status: DISCONTINUED | OUTPATIENT
Start: 2018-06-23 | End: 2018-06-23

## 2018-06-23 RX ORDER — AMOXICILLIN 250 MG
1 CAPSULE ORAL 2 TIMES DAILY
Status: DISCONTINUED | OUTPATIENT
Start: 2018-06-23 | End: 2018-06-25 | Stop reason: HOSPADM

## 2018-06-23 RX ORDER — OXYTOCIN 10 [USP'U]/ML
10 INJECTION, SOLUTION INTRAMUSCULAR; INTRAVENOUS
Status: DISCONTINUED | OUTPATIENT
Start: 2018-06-23 | End: 2018-06-23

## 2018-06-23 RX ORDER — OXYTOCIN/0.9 % SODIUM CHLORIDE 30/500 ML
340 PLASTIC BAG, INJECTION (ML) INTRAVENOUS CONTINUOUS PRN
Status: DISCONTINUED | OUTPATIENT
Start: 2018-06-23 | End: 2018-06-25 | Stop reason: HOSPADM

## 2018-06-23 RX ORDER — NALOXONE HYDROCHLORIDE 0.4 MG/ML
.1-.4 INJECTION, SOLUTION INTRAMUSCULAR; INTRAVENOUS; SUBCUTANEOUS
Status: DISCONTINUED | OUTPATIENT
Start: 2018-06-23 | End: 2018-06-25 | Stop reason: HOSPADM

## 2018-06-23 RX ADMIN — OXYTOCIN-SODIUM CHLORIDE 0.9% IV SOLN 30 UNIT/500ML 340 ML/HR: 30-0.9/5 SOLUTION at 16:26

## 2018-06-23 RX ADMIN — SODIUM CHLORIDE 2.5 MILLION UNITS: 9 INJECTION, SOLUTION INTRAVENOUS at 15:50

## 2018-06-23 RX ADMIN — ROPIVACAINE HYDROCHLORIDE 10 ML: 2 INJECTION, SOLUTION EPIDURAL; INFILTRATION at 07:00

## 2018-06-23 RX ADMIN — SODIUM CHLORIDE, POTASSIUM CHLORIDE, SODIUM LACTATE AND CALCIUM CHLORIDE: 600; 310; 30; 20 INJECTION, SOLUTION INTRAVENOUS at 09:31

## 2018-06-23 RX ADMIN — SODIUM CHLORIDE 2.5 MILLION UNITS: 9 INJECTION, SOLUTION INTRAVENOUS at 02:43

## 2018-06-23 RX ADMIN — ACETAMINOPHEN 650 MG: 325 TABLET, FILM COATED ORAL at 19:50

## 2018-06-23 RX ADMIN — SODIUM CHLORIDE 2.5 MILLION UNITS: 9 INJECTION, SOLUTION INTRAVENOUS at 06:45

## 2018-06-23 RX ADMIN — SENNOSIDES AND DOCUSATE SODIUM 2 TABLET: 8.6; 5 TABLET ORAL at 19:50

## 2018-06-23 RX ADMIN — SODIUM CHLORIDE, POTASSIUM CHLORIDE, SODIUM LACTATE AND CALCIUM CHLORIDE: 600; 310; 30; 20 INJECTION, SOLUTION INTRAVENOUS at 06:33

## 2018-06-23 RX ADMIN — IBUPROFEN 800 MG: 400 TABLET ORAL at 19:50

## 2018-06-23 RX ADMIN — Medication 12 ML/HR: at 07:04

## 2018-06-23 RX ADMIN — SODIUM CHLORIDE, POTASSIUM CHLORIDE, SODIUM LACTATE AND CALCIUM CHLORIDE 500 ML: 600; 310; 30; 20 INJECTION, SOLUTION INTRAVENOUS at 06:00

## 2018-06-23 RX ADMIN — SODIUM CHLORIDE 2.5 MILLION UNITS: 9 INJECTION, SOLUTION INTRAVENOUS at 11:20

## 2018-06-23 RX ADMIN — LIDOCAINE HYDROCHLORIDE AND EPINEPHRINE 3 ML: 15; 5 INJECTION, SOLUTION EPIDURAL at 06:58

## 2018-06-23 RX ADMIN — OXYTOCIN-SODIUM CHLORIDE 0.9% IV SOLN 30 UNIT/500ML 100 ML/HR: 30-0.9/5 SOLUTION at 16:56

## 2018-06-23 RX ADMIN — LEVOTHYROXINE SODIUM 100 MCG: 100 TABLET ORAL at 08:42

## 2018-06-23 NOTE — H&P
CC: Elevated BPs.  HPI: 36yo  at 39 6/7 GA c EDC 18 who was sent from clinic yesterday in the late afternoon for elevated BPs in the 150s/90s.  She had no symptoms of severe preeclampsia.  No CTX, LOF, VB, nor decreased FM.  No recent illnesses.  PNI: 1. Elevated BPs concerning for development of pre-eclampsia.  2. Hypothyroidism on synthroid 100mcg  3. GBS pos  PNL: B+, GBS pos, Rub immune, otherwise WNL  PNC: Reg visits since 11wks, mildly elevated BPs since 36 weeks, TWG 20lbs  PObHx: Primigravida  PGynHx: Reg menses, no STDs, no abn Paps, Hx infertility, Femvue completed  PMHx: Hypothyroidism  PSHx: Oral surgery, endoscopy  Meds: Synthroid 100mcg, PNV  Allergies: Sulfa  PSocHx: No Tob, EtOH, drugs  PFamHx: Pt's father a carrier for Dmitri Sachs    PE: See nursing notes.  Fetal Cat 1.  She was 1cm dilated in the office.  UP/Cr <0.05, Plt 186, ALT 16, AST 16, Cr 0.67    Assessment: Primigravida at term with gestational hypertension for induction of labor.  Plan: She received cervadil x1 followed by AROM and progressed to complete dilation.  She had an epidural for pain control which is working well.  She was found to be complete after a 4 minute bradycardia and labored down.  After an hour, she began actively pushing.  Anticipate .

## 2018-06-23 NOTE — PLAN OF CARE
Contractions noted to be every 1.5-3 min, patient reporting increased pain and feeling like she is getting no rest between contractions.  Fetal heart rate category 1.  IV bolus of 250 ml given, patient repositioned.  After bolus patient up to void and bloody show noted.  SVE 3.5/95/-3, Bradley 10.  Cervidil removed per orders.  Will continue to monitor and update MD.  Plan of care discussed with patient, states understanding.

## 2018-06-23 NOTE — ANESTHESIA PREPROCEDURE EVALUATION
Anesthesia Evaluation       history and physical reviewed .      History of anesthetic complications          ROS/MED HX    ENT/Pulmonary:       Neurologic:       Cardiovascular:         METS/Exercise Tolerance:     Hematologic:         Musculoskeletal:         GI/Hepatic:         Renal/Genitourinary:         Endo:         Psychiatric:         Infectious Disease:         Malignancy:         Other:                                    Anesthesia Plan      History & Physical Review      ASA Status:  .  OB Epidural Asa: 2            Postoperative Care      Consents  Anesthetic plan, risks, benefits and alternatives discussed with:  Patient..                          .

## 2018-06-23 NOTE — PLAN OF CARE
0720 - Report received from NAYA Odom RN. Will assume care of patient at this time. Patient comfortable after epidural, denies any pain. Repositioned to left lateral side. Plan of care discussed and questions answered.   0823 - Dr. Loyd at bedside. AROM done at this time. Clear/bloody fluid noted, large amount. SVE 4/90/-2. Castellon 16F placed at this time without difficulty. Draining macario color urine. Patient repositioned and is comfortable. Synthroid given.   1000 - Patient called RN into room. Feeling pain and discomfort in lower left side. Patient on left lateral side for last 30 minutes and still uncomfortable. MDA paged to come eval/rebolus. Dr. Geovany PECK called back and will be up shortly  1030 - Dr. Armenta at bedside. Assessed patient and she is feeling better. Will not rebolus at this time.  1200 - Dr. Loyd in department, updated on patient status and SVE.   1255 - Patient starting to have left sided sharp pain again. Repositioned to left lateral and FHT down to 80's, repositioned back to right lateral, 02 10L started by mask. FHT back up to 130/140's.   1315 - Patient complete at this time. Dr. Loyd updated and laboring down started at this time.   1415 - Room set up for delivery. Patient instructed on how to push.   1420 - Pushing started at this time.  1510 - Dr. Loyd at bedside to check station of baby and pushing efforts. Patient pushing good at this time.   1530 - Patient verbalizing that she is getting tired and arms are exhausted. Patient pushing well and making good progress.   1540 - Dr. Loyd at bedside for delivery.   1603 -  viable baby girl, apgars 8 & 9. Infant admitted to  nursery. This RN remained at bedside during pushing, evaluating and reviewing fetal strip. See delivery summary for details.

## 2018-06-23 NOTE — PROVIDER NOTIFICATION
06/23/18 0540   Provider Notification   Provider Name/Title Dr Bach   Method of Notification Phone   Notification Reason Labor Status;Status Update;SVE   Dr Bach updated regarding SVE, cervidil removal and contraction status.  Patient requesting epidural at this time.  Orders obtained to start oxytocin induction if contractions space per protocol, OK for epidural when patient ready.  Dr Loyd to cover today.

## 2018-06-23 NOTE — OP NOTE
SURGEON: Srini Loyd MD  PREOPERATIVE DIAGNOSIS: Single intrauterine pregnancy at 39 6/7, gestational hypertension  POSTOPERATIVE DIAGNOSIS: Same, delivered  OPERATION PERFORMED: Normal spontaneous vaginal delivery  ANESTHESIA: Epidural  EBL: 750 mL  FLUIDS: Lactated Ringers at 125 mL/hour  URINE OUTPUT: Not measured  DRAINS: None  SPECIMENS: Cord blood   COMPLICATIONS: None  FINDINGS: Viable female infant weighing PENDING grams with APGARs of 8 and 9 at 1 and 5 minutes, respectively.  CONDITION: Both mother and infant stable in the immediate postpartum period. The patient remained in labor and delivery for recovery and the infant was kept in the room with her.  INDICATIONS: The patient is a 37 year old  who presented at 39 5/7 weeks estimated gestational age for an induction of labor for elevated blood pressures.  She did not meet criteria for preeclampsia but was induced for gestational hypertension.  She began her induction with Cervadil followed by artificial rupture for clear fluid.  She then progressed to complete dilation and labored down.  She was treated with PCN for GBS positive status.  She had an epidural which was working well.    OPERATION: The patient progressed to complete/complete/+ 2 station and pushed for 120 minutes to deliver a viable female infant weighing PENDING grams with APGARs of 8 and 9 at one and five minutes, respectively via  over an intact perineum under epidural anesthesia. The baby delivered in right occiput anterior position. There was no nuchal cord. The remainder of the body delivered without incident. Nose and mouth were bulb suctioned and the cord was clamped times two and cut. The baby was placed on the maternal abdomen. Placenta, membranes, and a three vessel cord delivered spontaneously and intact. Inspection of the perineum revealed a second degree perineal laceration with bilateral sulcal extensions with brisk bleeding from both sides of the lacerations.   These were repaired with 2 2-0 Vicryl CT-1 in running locked sutures followed by repair of the second degree laceration in routine fashion with excellent hemostasis. The rectum, sulci, and cervix were inspected and noted to be intact. The fundus was firm with IV oxytocin and fundal massage. There were no sponges left in the vagina.  Excess blood loss was due to obstetrical lacerations.  Needle and sponge count were correct times two.

## 2018-06-23 NOTE — PROGRESS NOTES
Doing well, comfortable with epidural.  No s/s PET.  Fetal Cat 1, [place] q3-4 minutes.  Vitals BPs 130s-150s/80s-90s, no severe range BPs.  SVE /-2, bloody show, AROM clear fluid.   at 39 6/7 with PET without severe features.  S/p cervadil and AROM.  Consider oxytocin if contractions not changing cervix after AROM.  Continue to monitor BPs, if s/s of severe PET, then will begin magnesium for seizure prophylaxis.

## 2018-06-23 NOTE — ANESTHESIA PROCEDURE NOTES
Peripheral nerve/Neuraxial procedure note : epidural catheter  Pre-Procedure  Performed by ARABELLA SUN  Location: OB      Pre-Anesthestic Checklist: patient identified, IV checked, risks and benefits discussed, informed consent and monitors and equipment checked    Timeout  Correct Patient: Yes   Correct Procedure: Yes   Correct Site: Yes   Correct Laterality: N/A   Correct Position: Yes   Site Marked: N/A   .   Procedure Documentation    .    Procedure:    Epidural catheter.  Insertion Site:L3-4  (midline approach) Injection technique: LORT saline   Local skin infiltrated with 3 mL of 1% lidocaine.  JOSE at 5 cm     Patient Prep;povidone-iodine 7.5% surgical scrub.  .  Needle: ToBitLity needle Needle Gauge: 17.    Needle Length (Inches) 3.5  # of attempts: 1 and # of redirects:  .   . .  Catheter threaded easily  5 cm epidural space.  10 cm at skin.   .    Assessment/Narrative  Paresthesias: No.  .  .  Aspiration negative for heme or CSF  . Test dose of 3 mL lidocaine 1.5% w/ 1:200,000 epinephrine at. Test dose negative for signs of intravascular, subdural or intrathecal injection. Comments:  Labor Epidural  No complications.

## 2018-06-23 NOTE — PLAN OF CARE
"1811 - Primp admitted from triage into room 219. Report received from SHARON Saenz RN at this time.  Monitors applied at this time.   1830 - IV 18g started without difficult.   1845 - PCN 5mu started per Dr. Bach orders. Patient noted to be nicko every 1-5 minutes, patient reports them feeling like menstrual cramps. Will continue to watch uterine activity and update Dr. Bach.   1905 - Dr. Bach on phone updated on uterine activity. Plan for RN to check cervix and en route to hospital at this time.   2030 - Patient continues to feel \"cramping\" here and there but not uncomfortable, cervivdil placed at this time. SVE 1.5/60/-3.   2130 - Dr. Bach in department, updated on patient status, uterine activity and SVE.   2245 - 2nd dose of PCN given. Patient continues to feel menstrual cramps and states they are getting a little stronger.   2300 - Patient off monitor to get ready for bed. Report given to NAYA Dewitt RN.  "

## 2018-06-24 LAB
ERYTHROCYTE [DISTWIDTH] IN BLOOD BY AUTOMATED COUNT: 13.4 % (ref 10–15)
HCT VFR BLD AUTO: 22.7 % (ref 35–47)
HGB BLD-MCNC: 7.6 G/DL (ref 11.7–15.7)
HGB BLD-MCNC: 8 G/DL (ref 11.7–15.7)
MCH RBC QN AUTO: 29.7 PG (ref 26.5–33)
MCHC RBC AUTO-ENTMCNC: 35.2 G/DL (ref 31.5–36.5)
MCV RBC AUTO: 84 FL (ref 78–100)
PLATELET # BLD AUTO: 173 10E9/L (ref 150–450)
RBC # BLD AUTO: 2.69 10E12/L (ref 3.8–5.2)
T PALLIDUM AB SER QL: NONREACTIVE
WBC # BLD AUTO: 15.9 10E9/L (ref 4–11)

## 2018-06-24 PROCEDURE — 36415 COLL VENOUS BLD VENIPUNCTURE: CPT | Performed by: OBSTETRICS & GYNECOLOGY

## 2018-06-24 PROCEDURE — 12000037 ZZH R&B POSTPARTUM INTERMEDIATE

## 2018-06-24 PROCEDURE — 85027 COMPLETE CBC AUTOMATED: CPT | Performed by: OBSTETRICS & GYNECOLOGY

## 2018-06-24 PROCEDURE — 85018 HEMOGLOBIN: CPT | Performed by: OBSTETRICS & GYNECOLOGY

## 2018-06-24 PROCEDURE — 25000132 ZZH RX MED GY IP 250 OP 250 PS 637: Performed by: OBSTETRICS & GYNECOLOGY

## 2018-06-24 RX ORDER — FERROUS SULFATE 325(65) MG
325 TABLET ORAL DAILY
Status: DISCONTINUED | OUTPATIENT
Start: 2018-06-24 | End: 2018-06-25 | Stop reason: HOSPADM

## 2018-06-24 RX ADMIN — ACETAMINOPHEN 650 MG: 325 TABLET, FILM COATED ORAL at 14:32

## 2018-06-24 RX ADMIN — SENNOSIDES AND DOCUSATE SODIUM 2 TABLET: 8.6; 5 TABLET ORAL at 20:16

## 2018-06-24 RX ADMIN — SENNOSIDES AND DOCUSATE SODIUM 1 TABLET: 8.6; 5 TABLET ORAL at 08:23

## 2018-06-24 RX ADMIN — LEVOTHYROXINE SODIUM 100 MCG: 100 TABLET ORAL at 08:24

## 2018-06-24 RX ADMIN — FERROUS SULFATE TAB 325 MG (65 MG ELEMENTAL FE) 325 MG: 325 (65 FE) TAB at 14:32

## 2018-06-24 RX ADMIN — IBUPROFEN 800 MG: 400 TABLET ORAL at 14:32

## 2018-06-24 RX ADMIN — IBUPROFEN 800 MG: 400 TABLET ORAL at 08:23

## 2018-06-24 RX ADMIN — IBUPROFEN 800 MG: 400 TABLET ORAL at 02:34

## 2018-06-24 RX ADMIN — ACETAMINOPHEN 650 MG: 325 TABLET, FILM COATED ORAL at 20:16

## 2018-06-24 RX ADMIN — IBUPROFEN 800 MG: 400 TABLET ORAL at 20:16

## 2018-06-24 RX ADMIN — ACETAMINOPHEN 650 MG: 325 TABLET, FILM COATED ORAL at 02:34

## 2018-06-24 RX ADMIN — ACETAMINOPHEN 650 MG: 325 TABLET, FILM COATED ORAL at 08:24

## 2018-06-24 NOTE — PROGRESS NOTES
Pt. admitted from L&D  via wheelchair and transferred to bed with assist x1. Pt. arrived with baby and was accompanied by  and arrived with personal belongings. Report was taken from Helen in L&D. VSS. Fundus is firm and midline.  Vaginal bleeding is small.  SL in left hand.  Pt. oriented to the room and call light system.

## 2018-06-24 NOTE — PROGRESS NOTES
[facility]       DAILY NOTE - POSTPARTUM DAY 1     SUBJECTIVE:     Pain controlled? Yes  Tolerating a regular diet? YES  Ambulating? YES  Voiding without difficulty? Yes    OBJECTIVE:  Vitals:    18 1815 18 1830 18 2045 18 0030   BP: 143/89 152/82 131/79 127/81   Pulse:    96   Resp:   18 16   Temp:   98  F (36.7  C) 97.9  F (36.6  C)   TempSrc:   Oral Oral   SpO2:           Constitutional: healthy, alert and no distress    Abdomen:  Uterine fundus is firm, non-tender and at the level of the umbilicus     Incision: Healing well      LABS:  Hemoglobin   Date Value Ref Range Status   2018 8.0 (L) 11.7 - 15.7 g/dL Final   2018 11.2 (L) 11.7 - 15.7 g/dL Final       ASSESSMENT:  Post-partum day #1 s/p   Pregnancy complicated by gestational hypertension  Acute post-hemorrhagic anemia    Doing well.       PLAN:   Continue routine postpartum cares  Start iron sulfate for anemia    Delon Bach

## 2018-06-24 NOTE — PLAN OF CARE
Problem: Patient Care Overview  Goal: Plan of Care/Patient Progress Review  Outcome: Improving  Vital signs stable. Fundus firm, scant flow. Working on breastfeeding, on demand feedings encouraged. Pain managed with tylenol & ibuprofen. Patient ambulating independently and voiding adequately without difficulty. IV saline locked. On pathway, will continue to monitor.

## 2018-06-24 NOTE — ANESTHESIA POSTPROCEDURE EVALUATION
Patient: Tahmina Haji    * No procedures listed *    Diagnosis:* No pre-op diagnosis entered *  Diagnosis Additional Information: No value filed.    Anesthesia Type:  No value filed.    Note:  Anesthesia Post Evaluation    Patient location during evaluation: Bedside and Floor  Patient participation: Able to fully participate in evaluation  Level of consciousness: awake  Pain management: adequate  Airway patency: patent  Cardiovascular status: acceptable  Respiratory status: acceptable  Hydration status: acceptable  PONV: none     Anesthetic complications: None          Last vitals:  Vitals:    06/23/18 2045 06/24/18 0030 06/24/18 0823   BP: 131/79 127/81 (!) 126/91   Pulse:  96 97   Resp: 18 16 16   Temp: 36.7  C (98  F) 36.6  C (97.9  F) 36.7  C (98  F)   SpO2:            Electronically Signed By: Josue Kaplan MD  June 24, 2018  4:24 PM

## 2018-06-24 NOTE — LACTATION NOTE
Initial Lactation visit.  Recommend unlimited, frequent breast feedings: At least 8 - 12 times every 24 hours. Avoid pacifiers and supplementation with formula unless medically indicated. Explained benefits of holding baby skin on skin to help promote better breastfeeding outcomes.   Tahmina reports infant is feeding well.  She said she is latching easily.  Discussed second night cluster feeding.  Tahmina had no questions today.   Will revisit as needed.    Denice Montano RN, IBCLC

## 2018-06-24 NOTE — PLAN OF CARE
Problem: Patient Care Overview  Goal: Plan of Care/Patient Progress Review  Outcome: Improving  VSS.  Pain well controlled, requesting prn pain medications as needed.  Up to BR, tolerated fair, felt faint towards the end, voided independently.  Working on breastfeeding  and  cares. On pathway. Continue to monitor and notify MD as needed.

## 2018-06-25 VITALS
HEART RATE: 100 BPM | DIASTOLIC BLOOD PRESSURE: 92 MMHG | RESPIRATION RATE: 16 BRPM | SYSTOLIC BLOOD PRESSURE: 140 MMHG | TEMPERATURE: 98.3 F | OXYGEN SATURATION: 95 %

## 2018-06-25 LAB
ALT SERPL W P-5'-P-CCNC: 17 U/L (ref 0–50)
AST SERPL W P-5'-P-CCNC: 25 U/L (ref 0–45)
HGB BLD-MCNC: 7.9 G/DL (ref 11.7–15.7)

## 2018-06-25 PROCEDURE — 25000132 ZZH RX MED GY IP 250 OP 250 PS 637: Performed by: OBSTETRICS & GYNECOLOGY

## 2018-06-25 PROCEDURE — 85018 HEMOGLOBIN: CPT | Performed by: OBSTETRICS & GYNECOLOGY

## 2018-06-25 PROCEDURE — 36415 COLL VENOUS BLD VENIPUNCTURE: CPT | Performed by: OBSTETRICS & GYNECOLOGY

## 2018-06-25 PROCEDURE — 84460 ALANINE AMINO (ALT) (SGPT): CPT | Performed by: OBSTETRICS & GYNECOLOGY

## 2018-06-25 PROCEDURE — 84450 TRANSFERASE (AST) (SGOT): CPT | Performed by: OBSTETRICS & GYNECOLOGY

## 2018-06-25 RX ADMIN — ACETAMINOPHEN 650 MG: 325 TABLET, FILM COATED ORAL at 01:40

## 2018-06-25 RX ADMIN — SENNOSIDES AND DOCUSATE SODIUM 1 TABLET: 8.6; 5 TABLET ORAL at 08:43

## 2018-06-25 RX ADMIN — LEVOTHYROXINE SODIUM 100 MCG: 100 TABLET ORAL at 08:43

## 2018-06-25 RX ADMIN — IBUPROFEN 800 MG: 400 TABLET ORAL at 01:40

## 2018-06-25 RX ADMIN — FERROUS SULFATE TAB 325 MG (65 MG ELEMENTAL FE) 325 MG: 325 (65 FE) TAB at 12:37

## 2018-06-25 RX ADMIN — ACETAMINOPHEN 650 MG: 325 TABLET, FILM COATED ORAL at 08:43

## 2018-06-25 RX ADMIN — IBUPROFEN 800 MG: 400 TABLET ORAL at 08:44

## 2018-06-25 NOTE — PLAN OF CARE
"Problem: Patient Care Overview  Goal: Plan of Care/Patient Progress Review  Outcome: Adequate for Discharge Date Met: 18  VSS, fundus firm with no free flow or clots.  Independent with  cares and breastfeeding.  Pt BP is 140/92, she has bilat LE edema 2+, normal reflexes and no clonus.  Pt denies headache, epigastric pain or visual disturbances.  Pt states she gets up fine and goes to the bathroom, without SOB or dizziness, but after sitting in the bathroom pt gets \"floaties\" and this is similar to the \"floaties she gets after donating blood.\"  Call placed to MD and Hgb recheck is pending.  Pt still hoping to discharge today.  Enc pt to call for assist if she is dizzy or lightheaded at all.  Enc to call with needs, questions and concerns.      D: VSS, assessments WDL.   I: Pt. received complete discharge paperwork and home medications.  Pt. was given times of last dose for all discharge medications in writing on discharge medication sheets.  Discharge teaching included home medication, pain management, activity restrictions, postpartum cares, and signs and symptoms of infection.    A: Discharge outcomes on care plan met.  Mother states understanding and comfort with self and baby cares.  P: Pt. discharged to home.  Pt. was discharged with baby, and bands were checked at time of discharge.  Pt. was accompanied by , nurse and baby, and left with personal belongings.  Home care referral made.  Pt. to follow up with OB per MD order.  Pt. had no further questions at the time of discharge and no unmet needs were identified.     "

## 2018-06-25 NOTE — PROVIDER NOTIFICATION
06/25/18 1215   Provider Notification   Provider Name/Title Dr Martinez   Method of Notification Phone   Request Evaluate-Remote   Notification Reason Lab Results;Status Update   MD updated on pt Hgb, ALT and AST results.  Okay to discharge home and follow up in clinic this week for BP and Hgb check.

## 2018-06-25 NOTE — PLAN OF CARE
Problem: Postpartum (Vaginal Delivery) (Adult,Obstetrics,Pediatric)  Goal: Signs and Symptoms of Listed Potential Problems Will be Absent, Minimized or Managed (Postpartum)  Signs and symptoms of listed potential problems will be absent, minimized or managed by discharge/transition of care (reference Postpartum (Vaginal Delivery) (Adult,Obstetrics,Pediatric) CPG).   Outcome: Improving  Pt overall doing well.  BP slightly high and MD aware. HGB was 8.0 and MD notified on rounds, Oral iron started.  This afternoon pt stated feeling slightly light headed when ambulating and has x2 now. MD called at 1830 and informed about pts symptoms.  HGB ordered and to call if hgb less then 7. Breastfeeding going well . Will continue to monitor.

## 2018-06-25 NOTE — DISCHARGE INSTRUCTIONS
Preeclampsia   Call your doctor right away if you have any of the following:  - Edema (swelling) in your face or hands  - Rapid weight gain-about 1 pound or more in a day  - Headache  - Abdominal pain on your right side  - Vision problems (flashes or spots)  - You have questions or concerns once you return home.  Postpartum Vaginal Delivery Instructions    Activity       Ask family and friends for help when you need it.    Do not place anything in your vagina for 6 weeks.    You are not restricted on other activities, but take it easy for a few weeks to allow your body to recover from delivery.  You are able to do any activities you feel up to that point.    No driving until you have stopped taking your pain medications (usually two weeks after delivery).     Call your health care provider if you have any of these symptoms:       Increased pain, swelling, redness, or fluid around your stiches from an episiotomy or perineal tear.    A fever above 100.4 F (38 C) with or without chills when placing a thermometer under your tongue.    You soak a sanitary pad with blood within 1 hour, or you see blood clots larger than a golf ball.    Bleeding that lasts more than 6 weeks.    Vaginal discharge that smells bad.    Severe pain, cramping or tenderness in your lower belly area.    A need to urinate more frequently (use the toilet more often), more urgently (use the toilet very quickly), or it burns when you urinate.    Nausea and vomiting.    Redness, swelling or pain around a vein in your leg.    Problems breastfeeding or a red or painful area on your breast.    Chest pain and cough or are gasping for air.    Problems coping with sadness, anxiety, or depression.  If you have any concerns about hurting yourself or the baby, call your provider immediately.     You have questions or concerns after you return home.     Keep your hands clean:  Always wash your hands before touching your perineal area and stitches.  This helps  reduce your risk of infection.  If your hands aren't dirty, you may use an alcohol hand-rub to clean your hands. Keep your nails clean and short.

## 2018-06-25 NOTE — PROVIDER NOTIFICATION
06/25/18 1100   Provider Notification   Provider Name/Title Dr Schmitz   Method of Notification Phone   Request Evaluate-Remote   Notification Reason Status Update  (MD updated on VS and pt c/o floaties after ambulating)   Dr Martinez requested labs drawn ALT, AST and Hgb.  Will call him with results for further follow up.

## 2018-06-25 NOTE — PLAN OF CARE
Problem: Patient Care Overview  Goal: Plan of Care/Patient Progress Review  Outcome: Improving  Data: Vital signs within normal limits. Postpartum checks within normal limits - see flow record. Patient eating and drinking normally. Patient able to empty bladder independently and is up ambulating. No apparent signs of infection. perineum healing well. Patient performing self cares and is able to care for infant. Hgb re-check 7.6, MD to address in AM.  Action: Patient medicated during the shift for pain. See MAR. Patient reassessed within 1 hour after each medication and pain was improved - patient stated she was comfortable. Patient education done. See flow record.  Response: Positive attachment behaviors observed with infant. Support persons is present.   Plan: Anticipate discharge on 6/25.

## 2018-06-25 NOTE — PLAN OF CARE
Problem: Postpartum (Vaginal Delivery) (Adult,Obstetrics,Pediatric)  Goal: Signs and Symptoms of Listed Potential Problems Will be Absent, Minimized or Managed (Postpartum)  Signs and symptoms of listed potential problems will be absent, minimized or managed by discharge/transition of care (reference Postpartum (Vaginal Delivery) (Adult,Obstetrics,Pediatric) CPG).   Outcome: No Change  VSS.  Pain well controlled with Tyl and Ibu, requesting prn pain medications as needed.  Up independently in room.  Working on breastfeeding  and  cares. On pathway. Continue to monitor and notify MD as needed.

## 2018-06-25 NOTE — LACTATION NOTE
Initial visit.   Tahmina getting ready for discharge.  Plan: Watch for feeding cues and feed every 2-3 hours and/or on demand. Continue to use feeding log to track intake and appropriate voids and stools. Take feeding log to first follow up appointment or weight check. Encourage skin to skin to promote frequent feedings, thermoregulation and bonding. Follow-up with healthcare provider or lactation consultant for questions or concerns.    Outpatient resource phone numbers given. Will follow up with [facility].  No further questions at this time. Leanna Murphy BSN, RN, PHN, RNC-MNN, IBCLC

## 2019-01-01 ENCOUNTER — TRANSFERRED RECORDS (OUTPATIENT)
Dept: HEALTH INFORMATION MANAGEMENT | Facility: CLINIC | Age: 39
End: 2019-01-01

## 2019-01-01 LAB — PAP SMEAR - HIM PATIENT REPORTED: NEGATIVE

## 2019-05-09 ENCOUNTER — OFFICE VISIT (OUTPATIENT)
Dept: FAMILY MEDICINE | Facility: CLINIC | Age: 39
End: 2019-05-09
Payer: COMMERCIAL

## 2019-05-09 VITALS
BODY MASS INDEX: 35.51 KG/M2 | HEART RATE: 89 BPM | DIASTOLIC BLOOD PRESSURE: 84 MMHG | HEIGHT: 62 IN | SYSTOLIC BLOOD PRESSURE: 120 MMHG | TEMPERATURE: 98.2 F | WEIGHT: 193 LBS

## 2019-05-09 DIAGNOSIS — J01.90 ACUTE SINUSITIS WITH SYMPTOMS > 10 DAYS: Primary | ICD-10-CM

## 2019-05-09 PROCEDURE — 99213 OFFICE O/P EST LOW 20 MIN: CPT | Performed by: NURSE PRACTITIONER

## 2019-05-09 ASSESSMENT — MIFFLIN-ST. JEOR: SCORE: 1518.69

## 2019-05-09 NOTE — PROGRESS NOTES
"  SUBJECTIVE:                                                      aThmina Haji is a 38 year old female who presents to clinic today for the following health issues:    Acute Illness   Acute illness concerns: fever, cough, post nasal drip, sore throat   Onset: 10 days ago     Fever: First night only    Chills/Sweats: no    Headache (location?): no    Sinus Pressure:no    Conjunctivitis:  no    Ear Pain: no    Rhinorrhea: no    Congestion: yes     Sore Throat: YES     Cough: YES-productive of green sputum    Wheeze: no    Decreased Appetite: no    Nausea: no    Vomiting: no    Diarrhea:  no    Dysuria/Freq.: no    Fatigue/Achiness: no    Sick/Strep Exposure: no     Therapies Tried and outcome: sudafed, mucinex, cough medicine, nasacort, oral allergy tab, advil     HPI: Tahmina presents today with the complaint of 10 days of worsening sinus symptoms with concomitant sore throat and productive cough (green sputum). Had a fever initially, but none recently. No body aches, nausea, vomiting, rash, wheeze, shortness of breath. OTC remedies minimally helpful.    Problem list and histories reviewed & adjusted, as indicated.  Additional history: as documented    Reviewed and updated as needed this visit by clinical staff  Tobacco  Allergies  Meds  Problems  Med Hx  Surg Hx  Fam Hx  Soc Hx        Reviewed and updated as needed this visit by Provider  Tobacco  Allergies  Meds  Problems  Med Hx  Surg Hx  Fam Hx         ROS:  Constitutional, HEENT, pulmonary, GI, musculoskeletal, skin systems are negative, except as otherwise noted.    OBJECTIVE:                                                      /84 (BP Location: Left arm, Patient Position: Chair, Cuff Size: Adult Regular)   Pulse 89   Temp 98.2  F (36.8  C) (Tympanic)   Ht 1.575 m (5' 2\")   Wt 87.5 kg (193 lb)   Breastfeeding? No   BMI 35.30 kg/m   Body mass index is 35.3 kg/m .   GENERAL: healthy, alert, well nourished, well hydrated, no " distress  HENT: ear canals- normal; TMs- bilaterally with bulge, blunting of landmarks, and mucoid fluid behind; Nose- normal; Mouth- mildly erythematous posterior oropharynx, but without edema or exudate noted  NECK: no tenderness, no adenopathy, no asymmetry, no masses, no stiffness; thyroid- normal to palpation  RESP: lungs clear to auscultation - no rales, no rhonchi, no wheezes. Aggressive cough.  CV: regular rates and rhythm, normal S1 S2, no S3 or S4 and no murmur, no click or rub -    Diagnostic test results:  none     ASSESSMENT/PLAN:                                                      Tahmina was seen today for cough. History and exam suggestive of acute bacterial sinusitis. Given duration of symptoms, will treat with Augmentin for 10 days. Symptomatic cares discussed in great detail. Discussed reasons to call or return to clinic. Tahmina acknowledges and demonstrates understanding of circumstances under which care should be sought urgently or emergently. Follow up as discussed. Discussed risks, benefits, alternatives, potential side effects, and proper administration of new medication / treatment. Agrees with plan of care. All questions answered.     Diagnoses and all orders for this visit:    Acute sinusitis with symptoms > 10 days  -     amoxicillin-clavulanate (AUGMENTIN) 875-125 MG tablet; Take 1 tablet by mouth 2 times daily for 10 days      Risks, benefits and alternatives of treatments discussed. Plan agreed upon and all questions answered.      Follow-Up: Return in about 8 days (around 5/17/2019) for persistent or worsening symptoms.    See Patient Instructions      Srini Gonsalves, APRN, CNP

## 2019-05-09 NOTE — PATIENT INSTRUCTIONS
UPPER RESPIRATORY INFECTION SUPPORTIVE CARES:  Stay hydrated (even if you're not thirsty)  Over-the-counter decongestants (phenylephrine or Sudafed) for sinus and nasal congestion  Anti-histamine  Warm compresses over sinuses, or allow warm shower water to run down face  Neti-Pot sinus / nasal rinse  Flonase or other intranasal steroid MAY help  Humidifier at night  Tylenol or ibuprofen for fever and aches & pains  Salt water gargles, Chloraseptic spray, or Cepacol lozenges for sore throat  Over-the-counter cough suppressants, cough drops, or honey for cough  Call or return to the clinic if you are getting worse or not getting better in the coming days  Go to the Emergency Room if you have trouble breathing or if your fever gets really high

## 2019-05-09 NOTE — Clinical Note
Please abstract the following data from this visit with this patient into the appropriate field in Epic:Pap smear done on this date: 1/2019 (approximately), by this group: [facility] , results were normal .

## 2019-05-22 ENCOUNTER — TELEPHONE (OUTPATIENT)
Dept: FAMILY MEDICINE | Facility: CLINIC | Age: 39
End: 2019-05-22

## 2019-05-22 ENCOUNTER — OFFICE VISIT (OUTPATIENT)
Dept: FAMILY MEDICINE | Facility: CLINIC | Age: 39
End: 2019-05-22
Payer: COMMERCIAL

## 2019-05-22 VITALS
DIASTOLIC BLOOD PRESSURE: 80 MMHG | TEMPERATURE: 98.4 F | BODY MASS INDEX: 34.75 KG/M2 | SYSTOLIC BLOOD PRESSURE: 122 MMHG | OXYGEN SATURATION: 97 % | WEIGHT: 190 LBS | HEART RATE: 102 BPM

## 2019-05-22 DIAGNOSIS — J30.2 SEASONAL ALLERGIC RHINITIS, UNSPECIFIED TRIGGER: Primary | ICD-10-CM

## 2019-05-22 DIAGNOSIS — R09.81 NASAL CONGESTION: ICD-10-CM

## 2019-05-22 PROCEDURE — 99213 OFFICE O/P EST LOW 20 MIN: CPT | Performed by: FAMILY MEDICINE

## 2019-05-22 NOTE — TELEPHONE ENCOUNTER
Reason for Call:  Other     Detailed comments: pt called and stated that she been seen for Acute sinusitis with symptoms and she was taking antibiotics for 10 day. She is done all med now.    Per pt the symptoms are coming back on day 9 ( staffed up, ear full, cough getting worse with whizzing in the end of coughing)    Pt is wondering if she need more med or if she need to see the provider again     pls call the pt today ASAP because pt will be out out town starting tomorrow.    Phone Number Patient can be reached at: Cell number on file:    Telephone Information:   Mobile [phone]       Best Time: anytime     Can we leave a detailed message on this number? YES    Call taken on 5/22/2019 at 11:22 AM by BUD CARVAJAL

## 2019-05-22 NOTE — PROGRESS NOTES
Subjective     Tahmina Haji is a 38 year old female who presents to clinic today for the following health issues:    HPI   Acute Illness   Acute illness concerns: cough, wheezing, ear pain/fullness  Onset: 3+ weeks - was seen 5/9 completed augmentin    Fever: no     Chills/Sweats: no     Headache (location?): no     Sinus Pressure:YES    Conjunctivitis:  no    Ear Pain: YES-     Rhinorrhea: YES    Congestion: YES    Sore Throat: no      Cough: YES    Wheeze: YES    Decreased Appetite: no     Nausea: no     Vomiting: no     Diarrhea:  no     Dysuria/Freq.: no     Fatigue/Achiness: YES    Sick/Strep Exposure: no      Therapies Tried and outcome: sudafed         Reviewed and updated as needed this visit by Provider         Review of Systems   ROS COMP: negative other than noted above.      Objective    /80   Pulse 102   Temp 98.4  F (36.9  C) (Tympanic)   Wt 86.2 kg (190 lb)   SpO2 97%   BMI 34.75 kg/m    Body mass index is 34.75 kg/m .  Physical Exam   GENERAL: healthy, alert and no distress  HENT: ear canals and TM's normal, nose and mouth without ulcers or lesions  NECK: no adenopathy, no asymmetry, masses, or scars and thyroid normal to palpation  RESP: lungs clear to auscultation - no rales, rhonchi or wheezes  CV: regular rate and rhythm, normal S1 S2, no S3 or S4, no murmur, click or rub, no peripheral edema and peripheral pulses strong    Diagnostic Test Results:  Labs reviewed in Epic        Assessment & Plan     1. Seasonal allergic rhinitis, unspecified trigger  Patient has upper respiratory symptoms with some postnasal drainage which is resolving.  She recently finished antibiotic.  I suggested the symptoms are most likely some seasonal allergies.  She can continue Flonase or antihistamine.  I discussed with her briefly we can try a course of low-dose of prednisone to see if that helps.  Patient will let us know if the symptoms are not getting better.  I would not suggest any additional  antibiotics.    2. Nasal congestion                     No follow-ups on file.    Gregg Greenberg MD  [facility] [place]

## 2019-05-22 NOTE — Clinical Note
Please abstract the following data from this visit with this patient into the appropriate field in Epic:Pap smear done on this date: 1/2019 (approximately), by this group: [facility], results were normal.

## 2019-05-22 NOTE — TELEPHONE ENCOUNTER
Returned call to patient.  Patient stated she was feeling better while taking abx however on day 9 of abx course all previous symptoms returned and now has noticed some wheezing at the end of her cough.    An appointment was scheduled to be seen today by a provider, per LOV patient to f/u if no improvement or worsening symptoms.    KAYLA ParedesN, RN  [facility]

## 2019-06-17 ENCOUNTER — HOSPITAL ENCOUNTER (EMERGENCY)
Facility: CLINIC | Age: 39
Discharge: HOME | End: 2019-06-17
Attending: EMERGENCY MEDICINE | Admitting: EMERGENCY MEDICINE
Payer: COMMERCIAL

## 2019-06-17 ENCOUNTER — APPOINTMENT (OUTPATIENT)
Dept: CT IMAGING | Facility: CLINIC | Age: 39
End: 2019-06-17
Attending: EMERGENCY MEDICINE
Payer: COMMERCIAL

## 2019-06-17 ENCOUNTER — APPOINTMENT (OUTPATIENT)
Dept: ULTRASOUND IMAGING | Facility: CLINIC | Age: 39
End: 2019-06-17
Attending: EMERGENCY MEDICINE
Payer: COMMERCIAL

## 2019-06-17 VITALS
SYSTOLIC BLOOD PRESSURE: 130 MMHG | HEART RATE: 93 BPM | BODY MASS INDEX: 35.85 KG/M2 | OXYGEN SATURATION: 100 % | TEMPERATURE: 97.8 F | WEIGHT: 196 LBS | DIASTOLIC BLOOD PRESSURE: 97 MMHG | RESPIRATION RATE: 16 BRPM

## 2019-06-17 DIAGNOSIS — K80.50 BILIARY COLIC: ICD-10-CM

## 2019-06-17 DIAGNOSIS — R10.9 ABDOMINAL PAIN, UNSPECIFIED ABDOMINAL LOCATION: ICD-10-CM

## 2019-06-17 LAB
ALBUMIN SERPL-MCNC: 4 G/DL (ref 3.4–5)
ALBUMIN UR-MCNC: NEGATIVE MG/DL
ALP SERPL-CCNC: 85 U/L (ref 40–150)
ALT SERPL W P-5'-P-CCNC: 82 U/L (ref 0–50)
ANION GAP SERPL CALCULATED.3IONS-SCNC: 7 MMOL/L (ref 3–14)
APPEARANCE UR: ABNORMAL
AST SERPL W P-5'-P-CCNC: 34 U/L (ref 0–45)
BASOPHILS # BLD AUTO: 0 10E9/L (ref 0–0.2)
BASOPHILS NFR BLD AUTO: 0.4 %
BILIRUB SERPL-MCNC: 0.3 MG/DL (ref 0.2–1.3)
BILIRUB UR QL STRIP: NEGATIVE
BUN SERPL-MCNC: 16 MG/DL (ref 7–30)
CALCIUM SERPL-MCNC: 9.6 MG/DL (ref 8.5–10.1)
CHLORIDE SERPL-SCNC: 104 MMOL/L (ref 94–109)
CO2 SERPL-SCNC: 26 MMOL/L (ref 20–32)
COLOR UR AUTO: YELLOW
CREAT SERPL-MCNC: 0.7 MG/DL (ref 0.52–1.04)
DIFFERENTIAL METHOD BLD: NORMAL
EOSINOPHIL # BLD AUTO: 0.2 10E9/L (ref 0–0.7)
EOSINOPHIL NFR BLD AUTO: 1.9 %
ERYTHROCYTE [DISTWIDTH] IN BLOOD BY AUTOMATED COUNT: 13.7 % (ref 10–15)
GFR SERPL CREATININE-BSD FRML MDRD: >90 ML/MIN/{1.73_M2}
GLUCOSE SERPL-MCNC: 101 MG/DL (ref 70–99)
GLUCOSE UR STRIP-MCNC: NEGATIVE MG/DL
HCG UR QL: NEGATIVE
HCT VFR BLD AUTO: 41.3 % (ref 35–47)
HGB BLD-MCNC: 14.1 G/DL (ref 11.7–15.7)
HGB UR QL STRIP: NEGATIVE
IMM GRANULOCYTES # BLD: 0 10E9/L (ref 0–0.4)
IMM GRANULOCYTES NFR BLD: 0.1 %
KETONES UR STRIP-MCNC: NEGATIVE MG/DL
LEUKOCYTE ESTERASE UR QL STRIP: ABNORMAL
LIPASE SERPL-CCNC: 164 U/L (ref 73–393)
LYMPHOCYTES # BLD AUTO: 1.8 10E9/L (ref 0.8–5.3)
LYMPHOCYTES NFR BLD AUTO: 22 %
MCH RBC QN AUTO: 28.1 PG (ref 26.5–33)
MCHC RBC AUTO-ENTMCNC: 34.1 G/DL (ref 31.5–36.5)
MCV RBC AUTO: 82 FL (ref 78–100)
MONOCYTES # BLD AUTO: 0.6 10E9/L (ref 0–1.3)
MONOCYTES NFR BLD AUTO: 7 %
NEUTROPHILS # BLD AUTO: 5.7 10E9/L (ref 1.6–8.3)
NEUTROPHILS NFR BLD AUTO: 68.6 %
NITRATE UR QL: NEGATIVE
NRBC # BLD AUTO: 0 10*3/UL
NRBC BLD AUTO-RTO: 0 /100
PH UR STRIP: 6.5 PH (ref 5–7)
PLATELET # BLD AUTO: 243 10E9/L (ref 150–450)
POTASSIUM SERPL-SCNC: 3.8 MMOL/L (ref 3.4–5.3)
PROT SERPL-MCNC: 7.8 G/DL (ref 6.8–8.8)
RBC # BLD AUTO: 5.02 10E12/L (ref 3.8–5.2)
RBC #/AREA URNS AUTO: <1 /HPF (ref 0–2)
SODIUM SERPL-SCNC: 137 MMOL/L (ref 133–144)
SOURCE: ABNORMAL
SP GR UR STRIP: 1.01 (ref 1–1.03)
SQUAMOUS #/AREA URNS AUTO: 20 /HPF (ref 0–1)
UROBILINOGEN UR STRIP-MCNC: NORMAL MG/DL (ref 0–2)
WBC # BLD AUTO: 8.3 10E9/L (ref 4–11)
WBC #/AREA URNS AUTO: 7 /HPF (ref 0–5)

## 2019-06-17 PROCEDURE — 99285 EMERGENCY DEPT VISIT HI MDM: CPT | Mod: 25

## 2019-06-17 PROCEDURE — 83690 ASSAY OF LIPASE: CPT | Performed by: EMERGENCY MEDICINE

## 2019-06-17 PROCEDURE — 85025 COMPLETE CBC W/AUTO DIFF WBC: CPT | Performed by: EMERGENCY MEDICINE

## 2019-06-17 PROCEDURE — 74176 CT ABD & PELVIS W/O CONTRAST: CPT

## 2019-06-17 PROCEDURE — 81025 URINE PREGNANCY TEST: CPT | Performed by: EMERGENCY MEDICINE

## 2019-06-17 PROCEDURE — 81001 URINALYSIS AUTO W/SCOPE: CPT | Performed by: EMERGENCY MEDICINE

## 2019-06-17 PROCEDURE — 96361 HYDRATE IV INFUSION ADD-ON: CPT

## 2019-06-17 PROCEDURE — 76705 ECHO EXAM OF ABDOMEN: CPT

## 2019-06-17 PROCEDURE — 96374 THER/PROPH/DIAG INJ IV PUSH: CPT

## 2019-06-17 PROCEDURE — 80053 COMPREHEN METABOLIC PANEL: CPT | Performed by: EMERGENCY MEDICINE

## 2019-06-17 PROCEDURE — 25000128 H RX IP 250 OP 636: Performed by: EMERGENCY MEDICINE

## 2019-06-17 RX ORDER — SODIUM CHLORIDE 9 MG/ML
1000 INJECTION, SOLUTION INTRAVENOUS CONTINUOUS
Status: DISCONTINUED | OUTPATIENT
Start: 2019-06-17 | End: 2019-06-17 | Stop reason: HOSPADM

## 2019-06-17 RX ORDER — KETOROLAC TROMETHAMINE 15 MG/ML
15 INJECTION, SOLUTION INTRAMUSCULAR; INTRAVENOUS ONCE
Status: COMPLETED | OUTPATIENT
Start: 2019-06-17 | End: 2019-06-17

## 2019-06-17 RX ADMIN — SODIUM CHLORIDE 1000 ML: 9 INJECTION, SOLUTION INTRAVENOUS at 06:38

## 2019-06-17 RX ADMIN — KETOROLAC TROMETHAMINE 15 MG: 15 INJECTION, SOLUTION INTRAMUSCULAR; INTRAVENOUS at 07:03

## 2019-06-17 ASSESSMENT — ENCOUNTER SYMPTOMS
HEMATURIA: 0
BLOOD IN STOOL: 0
ABDOMINAL PAIN: 1
DIARRHEA: 1
SORE THROAT: 0
FEVER: 0
FLANK PAIN: 1
COUGH: 0

## 2019-06-17 NOTE — ED AVS SNAPSHOT
Emergency Department  [address] [place] [id number]  Phone:  [phone]  Fax:  [phone]                                    Tahmina Haji   MRN: [id number]    Department:   Emergency Department   Date of Visit:  6/17/2019           After Visit Summary Signature Page    I have received my discharge instructions, and my questions have been answered. I have discussed any challenges I see with this plan with the nurse or doctor.    ..........................................................................................................................................  Patient/Patient Representative Signature      ..........................................................................................................................................  Patient Representative Print Name and Relationship to Patient    ..................................................               ................................................  Date                                   Time    ..........................................................................................................................................  Reviewed by Signature/Title    ...................................................              ..............................................  Date                                               Time          22EPIC Rev 08/18

## 2019-06-17 NOTE — ED PROVIDER NOTES
"  History     Chief Complaint:  Flank Pain    HPI   Tahmina Haji is a 38 year old female with a history of hypertension who presents to the ED for evaluation of flank pain. The patient reports that she has experienced episodes of abdominal pain intermittently over the past year, which have worsened in the past few days. She describes her discomfort as a band wrapping around her upper abdomen, with the most severe pain localized to the right flank. This has always resolved, although she has been in constant discomfort since 2330 yesterday evening. The patient has tried taking pain medication and changing positions without any relief, prompting her visit to the ED this morning. Here in the ED, the patient states \"I think it's a kidney stone,\" although she denies any hematuria or past history of kidney stones. She does report having diarrhea for the past week, although she denies any bloody stools, fevers, sore throat, cough, or recent cold. She denies any history of surgery or pancreatitis. The patient also notes that she has an IUD in place so is not able to recall her last menstrual period. Of note, the patient endorses consuming significantly more alcohol than usual on a recent trip on a cruise and she just got back last night at 9PM, although she has not had any alcohol for several days.     Allergies:  Sulfa Drugs     Medications:    Levothyroxine     Past Medical History:    Hypertension   Hypothyroidism    Past Surgical History:    Tooth extraction    Family History:    No past pertinent family history.    Social History:  Negative for tobacco use.  Alcohol Use: Yes  Negative for drug use.   Marital Status:   [2]     Review of Systems   Constitutional: Negative for fever.   HENT: Negative for sore throat.    Respiratory: Negative for cough.    Gastrointestinal: Positive for abdominal pain and diarrhea. Negative for blood in stool.   Genitourinary: Positive for flank pain. Negative for hematuria. "   All other systems reviewed and are negative.    Physical Exam     Patient Vitals for the past 24 hrs:   BP Temp Temp src Pulse Resp SpO2 Weight   06/17/19 0745 118/77 -- -- 93 -- -- --   06/17/19 0710 -- -- -- -- -- 100 % --   06/17/19 0654 (!) 148/102 -- -- 82 16 100 % --   06/17/19 0616 (!) 158/118 97.8  F (36.6  C) Oral 104 18 99 % 88.9 kg (196 lb)        Physical Exam  Physical Exam   General:  Sitting on bed with  at bedside.   HENT:  No obvious trauma to head  Right Ear:  External ear normal.   Left Ear:  External ear normal.   Nose:  Nose normal.   Eyes:  Conjunctivae and EOM are normal. Pupils are equal, round, and reactive.   Neck: Normal range of motion. Neck supple. No tracheal deviation present.   CV:  Normal heart sounds. No murmur heard.  Pulm/Chest: Effort normal and breath sounds normal.   Abd: Soft. No distension. There is slight RUQ tenderness. There is no rigidity, no rebound and no guarding.   M/S: Normal range of motion.   Neuro: Alert. GCS 15.  Skin: Skin is warm and dry. No rash noted. Not diaphoretic.   Psych: Normal mood and affect. Behavior is normal.     Emergency Department Course   Imaging:  Radiographic findings were communicated with the patient who voiced understanding of the findings.  CT Abdomen/Pelvis w/o IV contrast-stone protocol:   1. Mild distention of the right ureter, but no obstructing stone is seen. No hydronephrosis. Correlate clinically with urinary tract  infection. Recent passage of a stone may also be possible.  2. Distention of the gallbladder with tiny faint gallstones. Correlate clinically with any evidence of cholecystitis. Ultrasound could provide clarification.  3. No other acute abnormality. Normal appendix, as per radiology.      US Abdomen, Limited (RUQ only):  Small gallbladder sludge versus cholelithiasis. Negative sonographic Burgos's sign. No biliary dilatation.   -As per radiology.     Laboratory:  CBC: WBC: 8.3, HGB: 14.1, PLT: 243  CMP: Glucose  101 (H), ALT 82 (H), o/w WNL (Creatinine: 0.70)    Lipase: 164    HCG: Negative    UA with Microscopic: Leukocyte esterase moderate (A), WBC 7 (H), SE 20 (H), o/w WNL   Urine culture: pending    Interventions:  0638 NS 1L IV   0703 Toradol 15 mg IV    Emergency Department Course:  Nursing notes and vitals reviewed. (0615) I performed an exam of the patient as documented above.     IV inserted. Medicine administered as documented above. Blood drawn. This was sent to the lab for further testing, results above.    The patient provided a urine sample here in the emergency department. This was sent for laboratory testing, findings above.      The patient was sent for an abdominal CT while in the emergency department, findings above.     (0706) I rechecked the patient and discussed the results of her workup thus far.     (0752) I reevaluated the patient and provided an update in regards to her ED course.      (0845) patient reports she is feeling significantly better.    The patient was sent for a right upper quadrant abdominal ultrasound, findings above.     Findings and plan explained to the patient. Patient discharged home with instructions regarding supportive care, medications, and reasons to return. The importance of close follow-up was reviewed.    I personally reviewed the laboratory results with the patient and answered all related questions prior to discharge.     Impression & Plan    Medical Decision Making:  Tahmina Haji is a very pleasant 38 year old year old patient who presents to the emergency department with concern of abdominal pain.  The patient thought she may have a kidney stone.  The pain is in the right upper quadrant and wraps around to the flank and back.  Urinalysis shows likely just contaminant.  She has no urinary symptoms.  A urine culture is ordered and if this returns positive antibiotics will need to be called in.  There is no hematuria, but given her concern and area of pain, I did  performed a CT scan that shows no evidence of kidney stone.  I did show the above findings though.  I reviewed these with the patient.  I considered cholecystitis but the patient has no fever or leukocytosis.  Also consider biliary colic.  A right upper quadrant ultrasound shows above findings.  This patient has symptoms consistent with gallstones and biliary colic.  Ultrasound has confirmed the presence of gallstones.  There is no clinical, laboratory, or ultrasound evidence of choledocholithiasis, gallstone pancreatitis, or ascending cholangitis.  I doubt perforated ulcer, diverticulitis, colitis.  Certainly also may have component of GERD or gastritis or dietary changes secondary to being on a cruise.  I discussed dietary changes and recommended over-the-counter Tylenol as needed for pain control.  The patient was educated to avoid fatty foods.  The patient was told to return to ED for increasing pain, vomiting, chills, sweats, or fever.  Follow up with general surgery is indicated for outpatient consultation, this may be arranged as soon as able.  See primary care doctor this week for recheck.      The treatment plan was discussed with the patient and they expressed understanding of this plan and consented to the plan.  In addition, the patient will return to the emergency department if their symptoms persist, worsen, if new symptoms arise or if there is any concern as other pathology may be present that is not evident at this time. They also understand the importance of close follow up in the clinic and if unable to do so will return to the emergency department for a reevaluation. All questions were answered.    Diagnosis:    ICD-10-CM    1. Abdominal pain, unspecified abdominal location R10.9    2. Biliary colic K80.50        Disposition:  Discharged to home    Discharge Medications:     Medication List      There are no discharge medications for this visit.         Scribe Disclosure:  Joy PEREZ, am  serving as a scribe on 6/17/2019 at 7:05 AM to personally document services performed by Jasbir Arzola DO based on my observations and the provider's statements to me.      Joy Chacon  6/17/2019    EMERGENCY DEPARTMENT       Jasbir Arzola DO  06/17/19 0909

## 2019-06-20 ENCOUNTER — NURSE TRIAGE (OUTPATIENT)
Dept: NURSING | Facility: CLINIC | Age: 39
End: 2019-06-20

## 2019-06-21 NOTE — TELEPHONE ENCOUNTER
"Caller states patient was seen in ED for abdominal pain and has a follow up appointment with the gastroenterologist. Caller states the exact same pain is back and rates pain 8/10 for about 12 hours now. Caller states she has taken ibuprofen without relief. Triage guidelines recommend to go to ED. Caller refused disposition, states they have a little child and currently do not have a sitter for the child.     Reason for Disposition    [1] SEVERE pain (e.g., excruciating) AND [2] present > 1 hour    Additional Information    Negative: Shock suspected (e.g., cold/pale/clammy skin, too weak to stand, low BP, rapid pulse)    Negative: Difficult to awaken or acting confused (e.g., disoriented, slurred speech)    Negative: Passed out (i.e., lost consciousness, collapsed and was not responding)    Negative: Sounds like a life-threatening emergency to the triager    Negative: Chest pain    Negative: Pain is mainly in upper abdomen  (if needed ask: \"is it mainly above the belly button?\")    Negative: Followed an abdomen (stomach) injury    Negative: [1] Abdominal pain AND [2] pregnant < 20 weeks    Negative: [1] Abdominal pain AND [2] pregnant > 20 weeks    Negative: [1] Abdominal pain AND [2] postpartum < 1 month since delivery    Protocols used: ABDOMINAL PAIN - FEMALE-A-AH      "

## 2020-03-02 ENCOUNTER — HEALTH MAINTENANCE LETTER (OUTPATIENT)
Age: 40
End: 2020-03-02

## 2020-12-20 ENCOUNTER — HEALTH MAINTENANCE LETTER (OUTPATIENT)
Age: 40
End: 2020-12-20

## 2021-02-22 ENCOUNTER — HOSPITAL ENCOUNTER (OUTPATIENT)
Dept: MAMMOGRAPHY | Facility: CLINIC | Age: 41
End: 2021-02-22
Attending: OBSTETRICS & GYNECOLOGY
Payer: COMMERCIAL

## 2021-02-22 DIAGNOSIS — N64.4 BREAST PAIN, LEFT: ICD-10-CM

## 2021-02-22 PROCEDURE — 76642 ULTRASOUND BREAST LIMITED: CPT | Mod: LT

## 2021-02-22 PROCEDURE — G0279 TOMOSYNTHESIS, MAMMO: HCPCS

## 2021-04-24 ENCOUNTER — HEALTH MAINTENANCE LETTER (OUTPATIENT)
Age: 41
End: 2021-04-24

## 2021-10-03 ENCOUNTER — HEALTH MAINTENANCE LETTER (OUTPATIENT)
Age: 41
End: 2021-10-03

## 2022-03-20 ENCOUNTER — HEALTH MAINTENANCE LETTER (OUTPATIENT)
Age: 42
End: 2022-03-20

## 2022-09-10 ENCOUNTER — HEALTH MAINTENANCE LETTER (OUTPATIENT)
Age: 42
End: 2022-09-10

## 2022-11-17 ENCOUNTER — LAB REQUISITION (OUTPATIENT)
Dept: LAB | Facility: CLINIC | Age: 42
End: 2022-11-17

## 2022-11-17 DIAGNOSIS — Z13.29 ENCOUNTER FOR SCREENING FOR OTHER SUSPECTED ENDOCRINE DISORDER: ICD-10-CM

## 2022-11-17 LAB
T4 FREE SERPL-MCNC: 1.34 NG/DL (ref 0.9–1.7)
T4 SERPL-MCNC: 8.8 UG/DL (ref 4.5–11.7)
TSH SERPL DL<=0.005 MIU/L-ACNC: 1.69 UIU/ML (ref 0.3–4.2)

## 2022-11-17 PROCEDURE — 84439 ASSAY OF FREE THYROXINE: CPT | Performed by: OBSTETRICS & GYNECOLOGY

## 2022-11-17 PROCEDURE — 86800 THYROGLOBULIN ANTIBODY: CPT | Performed by: OBSTETRICS & GYNECOLOGY

## 2022-11-17 PROCEDURE — 84443 ASSAY THYROID STIM HORMONE: CPT | Performed by: OBSTETRICS & GYNECOLOGY

## 2022-11-17 PROCEDURE — 86376 MICROSOMAL ANTIBODY EACH: CPT | Performed by: OBSTETRICS & GYNECOLOGY

## 2022-11-17 PROCEDURE — 84436 ASSAY OF TOTAL THYROXINE: CPT | Performed by: OBSTETRICS & GYNECOLOGY

## 2022-11-18 LAB
THYROGLOB AB SERPL IA-ACNC: <20 IU/ML
THYROPEROXIDASE AB SERPL-ACNC: 12 IU/ML

## 2023-01-27 ENCOUNTER — LAB REQUISITION (OUTPATIENT)
Dept: LAB | Facility: CLINIC | Age: 43
End: 2023-01-27
Payer: COMMERCIAL

## 2023-01-27 ENCOUNTER — LAB REQUISITION (OUTPATIENT)
Dept: LAB | Facility: CLINIC | Age: 43
End: 2023-01-27

## 2023-01-27 DIAGNOSIS — Z13.71 ENCOUNTER FOR NONPROCREATIVE SCREENING FOR GENETIC DISEASE CARRIER STATUS: ICD-10-CM

## 2023-01-27 DIAGNOSIS — Z01.419 ENCOUNTER FOR GYNECOLOGICAL EXAMINATION (GENERAL) (ROUTINE) WITHOUT ABNORMAL FINDINGS: ICD-10-CM

## 2023-01-27 LAB
ALBUMIN SERPL BCG-MCNC: 4.6 G/DL (ref 3.5–5.2)
ALP SERPL-CCNC: 48 U/L (ref 35–104)
ALT SERPL W P-5'-P-CCNC: 20 U/L (ref 10–35)
ANION GAP SERPL CALCULATED.3IONS-SCNC: 13 MMOL/L (ref 7–15)
AST SERPL W P-5'-P-CCNC: 23 U/L (ref 10–35)
BILIRUB SERPL-MCNC: 0.4 MG/DL
BUN SERPL-MCNC: 12.2 MG/DL (ref 6–20)
CALCIUM SERPL-MCNC: 9.2 MG/DL (ref 8.6–10)
CHLORIDE SERPL-SCNC: 103 MMOL/L (ref 98–107)
CHOLEST SERPL-MCNC: 230 MG/DL
CREAT SERPL-MCNC: 0.75 MG/DL (ref 0.51–0.95)
DEPRECATED HCO3 PLAS-SCNC: 23 MMOL/L (ref 22–29)
ERYTHROCYTE [DISTWIDTH] IN BLOOD BY AUTOMATED COUNT: 12.7 % (ref 10–15)
GFR SERPL CREATININE-BSD FRML MDRD: >90 ML/MIN/1.73M2
GLUCOSE SERPL-MCNC: 93 MG/DL (ref 70–99)
HBA1C MFR BLD: 6 %
HCT VFR BLD AUTO: 46.5 % (ref 35–47)
HDLC SERPL-MCNC: 67 MG/DL
HGB BLD-MCNC: 15.4 G/DL (ref 11.7–15.7)
LAB DIRECTOR COMMENTS: NORMAL
LAB DIRECTOR DISCLAIMER: NORMAL
LAB DIRECTOR INTERPRETATION: NORMAL
LAB DIRECTOR METHODOLOGY: NORMAL
LAB DIRECTOR RESULTS: NORMAL
LDLC SERPL CALC-MCNC: 153 MG/DL
MCH RBC QN AUTO: 28.4 PG (ref 26.5–33)
MCHC RBC AUTO-ENTMCNC: 33.1 G/DL (ref 31.5–36.5)
MCV RBC AUTO: 86 FL (ref 78–100)
NONHDLC SERPL-MCNC: 163 MG/DL
PLATELET # BLD AUTO: 273 10E3/UL (ref 150–450)
POTASSIUM SERPL-SCNC: 4.3 MMOL/L (ref 3.4–5.3)
PROT SERPL-MCNC: 7.4 G/DL (ref 6.4–8.3)
RBC # BLD AUTO: 5.42 10E6/UL (ref 3.8–5.2)
SODIUM SERPL-SCNC: 139 MMOL/L (ref 136–145)
SPECIMEN DESCRIPTION: NORMAL
TRIGL SERPL-MCNC: 49 MG/DL
WBC # BLD AUTO: 5.3 10E3/UL (ref 4–11)

## 2023-01-27 PROCEDURE — 80053 COMPREHEN METABOLIC PANEL: CPT | Performed by: OBSTETRICS & GYNECOLOGY

## 2023-01-27 PROCEDURE — G0452 MOLECULAR PATHOLOGY INTERPR: HCPCS | Mod: 26 | Performed by: STUDENT IN AN ORGANIZED HEALTH CARE EDUCATION/TRAINING PROGRAM

## 2023-01-27 PROCEDURE — 80061 LIPID PANEL: CPT | Performed by: OBSTETRICS & GYNECOLOGY

## 2023-01-27 PROCEDURE — 85027 COMPLETE CBC AUTOMATED: CPT | Performed by: OBSTETRICS & GYNECOLOGY

## 2023-01-27 PROCEDURE — 81291 MTHFR GENE: CPT | Mod: ORL | Performed by: OBSTETRICS & GYNECOLOGY

## 2023-01-27 PROCEDURE — 83036 HEMOGLOBIN GLYCOSYLATED A1C: CPT | Performed by: OBSTETRICS & GYNECOLOGY

## 2023-04-30 ENCOUNTER — HEALTH MAINTENANCE LETTER (OUTPATIENT)
Age: 43
End: 2023-04-30

## 2023-11-13 ENCOUNTER — OFFICE VISIT (OUTPATIENT)
Dept: FAMILY MEDICINE | Facility: CLINIC | Age: 43
End: 2023-11-13
Payer: COMMERCIAL

## 2023-11-13 VITALS
TEMPERATURE: 97.6 F | RESPIRATION RATE: 16 BRPM | SYSTOLIC BLOOD PRESSURE: 110 MMHG | HEART RATE: 116 BPM | WEIGHT: 183 LBS | BODY MASS INDEX: 33.68 KG/M2 | OXYGEN SATURATION: 98 % | DIASTOLIC BLOOD PRESSURE: 78 MMHG | HEIGHT: 62 IN

## 2023-11-13 DIAGNOSIS — J02.9 SORE THROAT: Primary | ICD-10-CM

## 2023-11-13 LAB
DEPRECATED S PYO AG THROAT QL EIA: NEGATIVE
GROUP A STREP BY PCR: NOT DETECTED

## 2023-11-13 PROCEDURE — 99203 OFFICE O/P NEW LOW 30 MIN: CPT | Performed by: NURSE PRACTITIONER

## 2023-11-13 PROCEDURE — 87651 STREP A DNA AMP PROBE: CPT | Performed by: NURSE PRACTITIONER

## 2023-11-13 ASSESSMENT — ENCOUNTER SYMPTOMS: SORE THROAT: 1

## 2023-11-13 NOTE — PROGRESS NOTES
"  Assessment & Plan     Sore throat  Patient presents to the clinic today with a 3 day history of a sore throat. Strep test negative today. Symptom management discussed. Patient encouraged to return to clinic if symptoms worsen.     - Streptococcus A Rapid Screen w/Reflex to PCR - Clinic Collect  - Group A Streptococcus PCR Throat Swab    -mucinex for post nasal drip    Ordering of each unique test         BMI:   Estimated body mass index is 33.47 kg/m  as calculated from the following:    Height as of this encounter: 1.575 m (5' 2\").    Weight as of this encounter: 83 kg (183 lb).   Weight management plan: deferred on this exam         Linda Cuadra, CNP  [facility] PRIOR ANNE Martel is a 43 year old, presenting for the following health issues:  Pharyngitis        11/13/2023    12:43 PM   Additional Questions   Roomed by Mariah FARMER CMA       History of Present Illness       Reason for visit:  Sore throat  Symptom onset:  1-3 days ago    She eats 4 or more servings of fruits and vegetables daily.She consumes 0 sweetened beverage(s) daily.She exercises with enough effort to increase her heart rate 20 to 29 minutes per day.  She exercises with enough effort to increase her heart rate 4 days per week.   She is taking medications regularly.         Acute Illness  Acute illness concerns: Sore Throat   Onset/Duration: a couple days   Symptoms:  Fever: No  Chills/Sweats: No  Headache (location?): No  Sinus Pressure: No  Conjunctivitis:  YES  Ear Pain: YES: both  Rhinorrhea: No  Congestion: YES  Sore Throat: YES  Cough: no  Wheeze: No  Decreased Appetite: No  Nausea: No  Vomiting: No  Diarrhea: No  Dysuria/Freq.: No  Dysuria or Hematuria: No  Fatigue/Achiness: YES  Sick/Strep Exposure: No  Therapies tried and outcome: nasal rinse and gargling salt water, ibuprofen     Patient also explains that she had strep three times earlier this year. Noticed white spots on her throat and is familiar with the " "symptoms she had before and those are the same symptoms she is having now. Claims right side is worse than left. Had it in April, May 10 days after medication. And then in late June.         Review of Systems   HENT:  Positive for sore throat.       Constitutional, HEENT, cardiovascular, pulmonary, gi and gu systems are negative, except as otherwise noted.      Objective    /78   Pulse 116   Temp 97.6  F (36.4  C) (Tympanic)   Resp 16   Ht 1.575 m (5' 2\")   Wt 83 kg (183 lb)   LMP 10/24/2023 (Approximate)   SpO2 98%   BMI 33.47 kg/m    Body mass index is 33.47 kg/m .    Physical Exam   GENERAL: healthy, alert and no distress  HENT: normal cephalic/atraumatic, ear canals and TM's normal, nose and mouth without ulcers or lesions, oropharynx clear, oral mucous membranes moist, mild erythema with no edema to the back of the throat.    NECK: moderate anterior cervical lymphadenopathy, no asymmetry, masses, or scars and thyroid normal to palpation  RESP: lungs clear to auscultation - no rales, rhonchi or wheezes  CV: regular rate and rhythm, normal S1 S2, no S3 or S4, no murmur, click or rub, no peripheral edema and peripheral pulses strong  MS: no gross musculoskeletal defects noted, no edema  NEURO: Normal strength and tone, mentation intact and speech normal  PSYCH: mentation appears normal, affect normal/bright    Results for orders placed or performed in visit on 11/13/23 (from the past 24 hour(s))   Streptococcus A Rapid Screen w/Reflex to PCR - Clinic Collect    Specimen: Throat; Swab   Result Value Ref Range    Group A Strep antigen Negative Negative             Linda PEREZ CNP was present with the [facility] medical/DON[employer] student who participated in the service and in the documentation of the note.  I have verified the history and personally performed the physical exam and medical decision making.  I agree with the assessment and plan of care as documented in the note.              "

## 2024-02-18 ENCOUNTER — HEALTH MAINTENANCE LETTER (OUTPATIENT)
Age: 44
End: 2024-02-18

## 2024-07-07 ENCOUNTER — HEALTH MAINTENANCE LETTER (OUTPATIENT)
Age: 44
End: 2024-07-07

## 2024-10-15 ENCOUNTER — LAB REQUISITION (OUTPATIENT)
Dept: LAB | Facility: CLINIC | Age: 44
End: 2024-10-15

## 2024-10-15 DIAGNOSIS — R39.9 UNSPECIFIED SYMPTOMS AND SIGNS INVOLVING THE GENITOURINARY SYSTEM: ICD-10-CM

## 2024-10-15 PROCEDURE — 87088 URINE BACTERIA CULTURE: CPT | Performed by: PHYSICIAN ASSISTANT

## 2024-10-16 LAB
BACTERIA UR CULT: ABNORMAL
BACTERIA UR CULT: ABNORMAL

## 2025-07-19 ENCOUNTER — HEALTH MAINTENANCE LETTER (OUTPATIENT)
Age: 45
End: 2025-07-19